# Patient Record
Sex: FEMALE | Race: WHITE | NOT HISPANIC OR LATINO | Employment: OTHER | URBAN - METROPOLITAN AREA
[De-identification: names, ages, dates, MRNs, and addresses within clinical notes are randomized per-mention and may not be internally consistent; named-entity substitution may affect disease eponyms.]

---

## 2025-01-19 ENCOUNTER — APPOINTMENT (OUTPATIENT)
Dept: CARDIOLOGY | Facility: HOSPITAL | Age: 80
End: 2025-01-19
Payer: MEDICARE

## 2025-01-19 ENCOUNTER — APPOINTMENT (OUTPATIENT)
Dept: RADIOLOGY | Facility: HOSPITAL | Age: 80
End: 2025-01-19
Payer: MEDICARE

## 2025-01-19 ENCOUNTER — HOSPITAL ENCOUNTER (INPATIENT)
Facility: HOSPITAL | Age: 80
LOS: 4 days | Discharge: NURSING, REHAB OR LONG-TERM CARE | End: 2025-01-24
Attending: EMERGENCY MEDICINE | Admitting: INTERNAL MEDICINE
Payer: MEDICARE

## 2025-01-19 DIAGNOSIS — I10 HYPERTENSION, UNSPECIFIED TYPE: ICD-10-CM

## 2025-01-19 DIAGNOSIS — R94.31 ABNORMAL ELECTROCARDIOGRAM (ECG) (EKG): ICD-10-CM

## 2025-01-19 DIAGNOSIS — R79.89 ELEVATED TROPONIN: ICD-10-CM

## 2025-01-19 DIAGNOSIS — D50.9 IRON DEFICIENCY ANEMIA, UNSPECIFIED IRON DEFICIENCY ANEMIA TYPE: ICD-10-CM

## 2025-01-19 DIAGNOSIS — R41.0 DELIRIUM: ICD-10-CM

## 2025-01-19 DIAGNOSIS — R41.0 CONFUSION AND DISORIENTATION: Primary | ICD-10-CM

## 2025-01-19 LAB
ACANTHOCYTES BLD QL SMEAR: NORMAL
ALBUMIN SERPL BCP-MCNC: 4.6 G/DL (ref 3.4–5)
ALP SERPL-CCNC: 85 U/L (ref 33–136)
ALT SERPL W P-5'-P-CCNC: 41 U/L (ref 7–45)
AMMONIA PLAS-SCNC: 28 UMOL/L (ref 16–53)
ANION GAP SERPL CALC-SCNC: 13 MMOL/L (ref 10–20)
AST SERPL W P-5'-P-CCNC: 103 U/L (ref 9–39)
BASOPHILS # BLD AUTO: 0.02 X10*3/UL (ref 0–0.1)
BASOPHILS NFR BLD AUTO: 0.3 %
BILIRUB SERPL-MCNC: 1.6 MG/DL (ref 0–1.2)
BNP SERPL-MCNC: 350 PG/ML (ref 0–99)
BUN SERPL-MCNC: 20 MG/DL (ref 6–23)
CALCIUM SERPL-MCNC: 9.2 MG/DL (ref 8.6–10.3)
CARDIAC TROPONIN I PNL SERPL HS: 74 NG/L (ref 0–13)
CHLORIDE SERPL-SCNC: 106 MMOL/L (ref 98–107)
CO2 SERPL-SCNC: 26 MMOL/L (ref 21–32)
CREAT SERPL-MCNC: 0.87 MG/DL (ref 0.5–1.05)
EGFRCR SERPLBLD CKD-EPI 2021: 67 ML/MIN/1.73M*2
EOSINOPHIL # BLD AUTO: 0.1 X10*3/UL (ref 0–0.4)
EOSINOPHIL NFR BLD AUTO: 1.7 %
ERYTHROCYTE [DISTWIDTH] IN BLOOD BY AUTOMATED COUNT: 15.3 % (ref 11.5–14.5)
FLUAV RNA RESP QL NAA+PROBE: NOT DETECTED
FLUBV RNA RESP QL NAA+PROBE: NOT DETECTED
GIANT PLATELETS BLD QL SMEAR: NORMAL
GLUCOSE SERPL-MCNC: 87 MG/DL (ref 74–99)
HCT VFR BLD AUTO: 29.2 % (ref 36–46)
HGB BLD-MCNC: 9.1 G/DL (ref 12–16)
IMM GRANULOCYTES # BLD AUTO: 0.02 X10*3/UL (ref 0–0.5)
IMM GRANULOCYTES NFR BLD AUTO: 0.3 % (ref 0–0.9)
LACTATE SERPL-SCNC: 1.1 MMOL/L (ref 0.4–2)
LYMPHOCYTES # BLD AUTO: 0.99 X10*3/UL (ref 0.8–3)
LYMPHOCYTES NFR BLD AUTO: 16.6 %
MAGNESIUM SERPL-MCNC: 1.96 MG/DL (ref 1.6–2.4)
MCH RBC QN AUTO: 30.2 PG (ref 26–34)
MCHC RBC AUTO-ENTMCNC: 31.2 G/DL (ref 32–36)
MCV RBC AUTO: 97 FL (ref 80–100)
MONOCYTES # BLD AUTO: 0.5 X10*3/UL (ref 0.05–0.8)
MONOCYTES NFR BLD AUTO: 8.4 %
NEUTROPHILS # BLD AUTO: 4.33 X10*3/UL (ref 1.6–5.5)
NEUTROPHILS NFR BLD AUTO: 72.7 %
NRBC BLD-RTO: 0 /100 WBCS (ref 0–0)
PLATELET # BLD AUTO: ABNORMAL 10*3/UL
POTASSIUM SERPL-SCNC: 4.7 MMOL/L (ref 3.5–5.3)
PROT SERPL-MCNC: 7.1 G/DL (ref 6.4–8.2)
RBC # BLD AUTO: 3.01 X10*6/UL (ref 4–5.2)
RBC MORPH BLD: NORMAL
SARS-COV-2 RNA RESP QL NAA+PROBE: NOT DETECTED
SCHISTOCYTES BLD QL SMEAR: NORMAL
SODIUM SERPL-SCNC: 140 MMOL/L (ref 136–145)
TSH SERPL-ACNC: 2.24 MIU/L (ref 0.44–3.98)
WBC # BLD AUTO: 6 X10*3/UL (ref 4.4–11.3)

## 2025-01-19 PROCEDURE — 71045 X-RAY EXAM CHEST 1 VIEW: CPT

## 2025-01-19 PROCEDURE — 93005 ELECTROCARDIOGRAM TRACING: CPT

## 2025-01-19 PROCEDURE — 70450 CT HEAD/BRAIN W/O DYE: CPT

## 2025-01-19 PROCEDURE — 71045 X-RAY EXAM CHEST 1 VIEW: CPT | Performed by: RADIOLOGY

## 2025-01-19 PROCEDURE — 99285 EMERGENCY DEPT VISIT HI MDM: CPT | Mod: 25 | Performed by: EMERGENCY MEDICINE

## 2025-01-19 PROCEDURE — 85025 COMPLETE CBC W/AUTO DIFF WBC: CPT

## 2025-01-19 PROCEDURE — 82140 ASSAY OF AMMONIA: CPT

## 2025-01-19 PROCEDURE — 36415 COLL VENOUS BLD VENIPUNCTURE: CPT

## 2025-01-19 PROCEDURE — 84443 ASSAY THYROID STIM HORMONE: CPT

## 2025-01-19 PROCEDURE — 99285 EMERGENCY DEPT VISIT HI MDM: CPT | Performed by: EMERGENCY MEDICINE

## 2025-01-19 PROCEDURE — 83880 ASSAY OF NATRIURETIC PEPTIDE: CPT

## 2025-01-19 PROCEDURE — 83735 ASSAY OF MAGNESIUM: CPT

## 2025-01-19 PROCEDURE — 70450 CT HEAD/BRAIN W/O DYE: CPT | Performed by: RADIOLOGY

## 2025-01-19 PROCEDURE — 84484 ASSAY OF TROPONIN QUANT: CPT

## 2025-01-19 PROCEDURE — 83605 ASSAY OF LACTIC ACID: CPT

## 2025-01-19 PROCEDURE — 80053 COMPREHEN METABOLIC PANEL: CPT

## 2025-01-19 PROCEDURE — 93010 ELECTROCARDIOGRAM REPORT: CPT | Performed by: EMERGENCY MEDICINE

## 2025-01-19 PROCEDURE — 87040 BLOOD CULTURE FOR BACTERIA: CPT | Mod: STJLAB

## 2025-01-19 PROCEDURE — 87636 SARSCOV2 & INF A&B AMP PRB: CPT

## 2025-01-19 RX ORDER — LABETALOL HYDROCHLORIDE 5 MG/ML
10 INJECTION, SOLUTION INTRAVENOUS ONCE
Status: DISCONTINUED | OUTPATIENT
Start: 2025-01-19 | End: 2025-01-20

## 2025-01-19 ASSESSMENT — LIFESTYLE VARIABLES
EVER HAD A DRINK FIRST THING IN THE MORNING TO STEADY YOUR NERVES TO GET RID OF A HANGOVER: NO
TOTAL SCORE: 0
EVER FELT BAD OR GUILTY ABOUT YOUR DRINKING: NO
HAVE PEOPLE ANNOYED YOU BY CRITICIZING YOUR DRINKING: NO
HAVE YOU EVER FELT YOU SHOULD CUT DOWN ON YOUR DRINKING: NO

## 2025-01-19 ASSESSMENT — PAIN - FUNCTIONAL ASSESSMENT: PAIN_FUNCTIONAL_ASSESSMENT: 0-10

## 2025-01-19 ASSESSMENT — COLUMBIA-SUICIDE SEVERITY RATING SCALE - C-SSRS
1. IN THE PAST MONTH, HAVE YOU WISHED YOU WERE DEAD OR WISHED YOU COULD GO TO SLEEP AND NOT WAKE UP?: NO
2. HAVE YOU ACTUALLY HAD ANY THOUGHTS OF KILLING YOURSELF?: NO
6. HAVE YOU EVER DONE ANYTHING, STARTED TO DO ANYTHING, OR PREPARED TO DO ANYTHING TO END YOUR LIFE?: NO

## 2025-01-19 ASSESSMENT — PAIN SCALES - GENERAL: PAINLEVEL_OUTOF10: 0 - NO PAIN

## 2025-01-20 ENCOUNTER — APPOINTMENT (OUTPATIENT)
Dept: CARDIOLOGY | Facility: HOSPITAL | Age: 80
End: 2025-01-20
Payer: MEDICARE

## 2025-01-20 PROBLEM — R46.89 WANDERING BEHAVIOR: Status: ACTIVE | Noted: 2025-01-20

## 2025-01-20 PROBLEM — R41.0 DELIRIUM: Status: ACTIVE | Noted: 2025-01-20

## 2025-01-20 PROBLEM — R41.0 CONFUSION AND DISORIENTATION: Status: ACTIVE | Noted: 2025-01-20

## 2025-01-20 PROBLEM — R79.89 ELEVATED TROPONIN: Status: ACTIVE | Noted: 2025-01-20

## 2025-01-20 LAB
APPEARANCE UR: CLEAR
BILIRUB UR STRIP.AUTO-MCNC: NEGATIVE MG/DL
CARDIAC TROPONIN I PNL SERPL HS: 70 NG/L (ref 0–13)
CARDIAC TROPONIN I PNL SERPL HS: 91 NG/L (ref 0–13)
COLOR UR: YELLOW
GLUCOSE UR STRIP.AUTO-MCNC: NORMAL MG/DL
HOLD SPECIMEN: NORMAL
KETONES UR STRIP.AUTO-MCNC: NEGATIVE MG/DL
LEUKOCYTE ESTERASE UR QL STRIP.AUTO: ABNORMAL
NITRITE UR QL STRIP.AUTO: NEGATIVE
PH UR STRIP.AUTO: 6.5 [PH]
PROT UR STRIP.AUTO-MCNC: ABNORMAL MG/DL
RBC # UR STRIP.AUTO: ABNORMAL /UL
RBC #/AREA URNS AUTO: NORMAL /HPF
SP GR UR STRIP.AUTO: 1.02
TSH SERPL-ACNC: 1.85 MIU/L (ref 0.44–3.98)
UROBILINOGEN UR STRIP.AUTO-MCNC: NORMAL MG/DL
VIT B12 SERPL-MCNC: 283 PG/ML (ref 211–911)
WBC #/AREA URNS AUTO: NORMAL /HPF

## 2025-01-20 PROCEDURE — 36415 COLL VENOUS BLD VENIPUNCTURE: CPT | Performed by: NURSE PRACTITIONER

## 2025-01-20 PROCEDURE — 81001 URINALYSIS AUTO W/SCOPE: CPT

## 2025-01-20 PROCEDURE — 84484 ASSAY OF TROPONIN QUANT: CPT

## 2025-01-20 PROCEDURE — 2500000004 HC RX 250 GENERAL PHARMACY W/ HCPCS (ALT 636 FOR OP/ED): Performed by: NURSE PRACTITIONER

## 2025-01-20 PROCEDURE — 36415 COLL VENOUS BLD VENIPUNCTURE: CPT

## 2025-01-20 PROCEDURE — 96375 TX/PRO/DX INJ NEW DRUG ADDON: CPT

## 2025-01-20 PROCEDURE — 96365 THER/PROPH/DIAG IV INF INIT: CPT

## 2025-01-20 PROCEDURE — 2500000004 HC RX 250 GENERAL PHARMACY W/ HCPCS (ALT 636 FOR OP/ED): Performed by: EMERGENCY MEDICINE

## 2025-01-20 PROCEDURE — 84484 ASSAY OF TROPONIN QUANT: CPT | Performed by: NURSE PRACTITIONER

## 2025-01-20 PROCEDURE — 87086 URINE CULTURE/COLONY COUNT: CPT | Mod: STJLAB

## 2025-01-20 PROCEDURE — 2500000001 HC RX 250 WO HCPCS SELF ADMINISTERED DRUGS (ALT 637 FOR MEDICARE OP): Performed by: EMERGENCY MEDICINE

## 2025-01-20 PROCEDURE — 84443 ASSAY THYROID STIM HORMONE: CPT | Performed by: NURSE PRACTITIONER

## 2025-01-20 PROCEDURE — 99222 1ST HOSP IP/OBS MODERATE 55: CPT | Performed by: NURSE PRACTITIONER

## 2025-01-20 PROCEDURE — 2060000001 HC INTERMEDIATE ICU ROOM DAILY

## 2025-01-20 PROCEDURE — 82607 VITAMIN B-12: CPT | Mod: STJLAB | Performed by: NURSE PRACTITIONER

## 2025-01-20 RX ORDER — ONDANSETRON HYDROCHLORIDE 2 MG/ML
4 INJECTION, SOLUTION INTRAVENOUS EVERY 8 HOURS PRN
Status: DISCONTINUED | OUTPATIENT
Start: 2025-01-20 | End: 2025-01-24 | Stop reason: HOSPADM

## 2025-01-20 RX ORDER — ROSUVASTATIN CALCIUM 5 MG/1
5 TABLET, COATED ORAL 2 TIMES WEEKLY
Status: DISCONTINUED | OUTPATIENT
Start: 2025-01-23 | End: 2025-01-24 | Stop reason: HOSPADM

## 2025-01-20 RX ORDER — ACETAMINOPHEN 160 MG/5ML
650 SOLUTION ORAL EVERY 4 HOURS PRN
Status: DISCONTINUED | OUTPATIENT
Start: 2025-01-20 | End: 2025-01-24 | Stop reason: HOSPADM

## 2025-01-20 RX ORDER — ROSUVASTATIN CALCIUM 5 MG/1
5 TABLET, COATED ORAL 2 TIMES WEEKLY
COMMUNITY

## 2025-01-20 RX ORDER — DONEPEZIL HYDROCHLORIDE 5 MG/1
5 TABLET, FILM COATED ORAL NIGHTLY
COMMUNITY

## 2025-01-20 RX ORDER — ONDANSETRON 4 MG/1
4 TABLET, ORALLY DISINTEGRATING ORAL EVERY 8 HOURS PRN
Status: DISCONTINUED | OUTPATIENT
Start: 2025-01-20 | End: 2025-01-24 | Stop reason: HOSPADM

## 2025-01-20 RX ORDER — NITROFURANTOIN 25; 75 MG/1; MG/1
100 CAPSULE ORAL 2 TIMES DAILY
COMMUNITY
End: 2025-01-24 | Stop reason: HOSPADM

## 2025-01-20 RX ORDER — TALC
3 POWDER (GRAM) TOPICAL NIGHTLY PRN
Status: DISCONTINUED | OUTPATIENT
Start: 2025-01-20 | End: 2025-01-24 | Stop reason: HOSPADM

## 2025-01-20 RX ORDER — VIT C/E/ZN/COPPR/LUTEIN/ZEAXAN 250MG-90MG
25 CAPSULE ORAL DAILY
COMMUNITY

## 2025-01-20 RX ORDER — CHOLECALCIFEROL (VITAMIN D3) 25 MCG
1000 TABLET ORAL DAILY
Status: DISCONTINUED | OUTPATIENT
Start: 2025-01-20 | End: 2025-01-24 | Stop reason: HOSPADM

## 2025-01-20 RX ORDER — METOPROLOL TARTRATE 1 MG/ML
5 INJECTION, SOLUTION INTRAVENOUS ONCE
Status: COMPLETED | OUTPATIENT
Start: 2025-01-20 | End: 2025-01-20

## 2025-01-20 RX ORDER — CEFTRIAXONE 1 G/50ML
1 INJECTION, SOLUTION INTRAVENOUS EVERY 24 HOURS
Status: DISCONTINUED | OUTPATIENT
Start: 2025-01-21 | End: 2025-01-23

## 2025-01-20 RX ORDER — POLYETHYLENE GLYCOL 3350 17 G/17G
17 POWDER, FOR SOLUTION ORAL DAILY PRN
Status: DISCONTINUED | OUTPATIENT
Start: 2025-01-20 | End: 2025-01-24 | Stop reason: HOSPADM

## 2025-01-20 RX ORDER — ACETAMINOPHEN 325 MG/1
650 TABLET ORAL EVERY 4 HOURS PRN
Status: DISCONTINUED | OUTPATIENT
Start: 2025-01-20 | End: 2025-01-24 | Stop reason: HOSPADM

## 2025-01-20 RX ORDER — BISMUTH SUBSALICYLATE 262 MG
1 TABLET,CHEWABLE ORAL DAILY
COMMUNITY

## 2025-01-20 RX ORDER — SODIUM CHLORIDE 0.9 % (FLUSH) 0.9 %
10 SYRINGE (ML) INJECTION EVERY 8 HOURS SCHEDULED
Status: DISCONTINUED | OUTPATIENT
Start: 2025-01-20 | End: 2025-01-24 | Stop reason: HOSPADM

## 2025-01-20 RX ORDER — AMLODIPINE BESYLATE 5 MG/1
5 TABLET ORAL DAILY
Status: DISCONTINUED | OUTPATIENT
Start: 2025-01-20 | End: 2025-01-24 | Stop reason: HOSPADM

## 2025-01-20 RX ORDER — NAPROXEN SODIUM 220 MG/1
324 TABLET, FILM COATED ORAL ONCE
Status: COMPLETED | OUTPATIENT
Start: 2025-01-20 | End: 2025-01-20

## 2025-01-20 RX ORDER — DONEPEZIL HYDROCHLORIDE 5 MG/1
5 TABLET, FILM COATED ORAL NIGHTLY
Status: DISCONTINUED | OUTPATIENT
Start: 2025-01-20 | End: 2025-01-24 | Stop reason: HOSPADM

## 2025-01-20 RX ORDER — ASPIRIN 81 MG/1
81 TABLET ORAL DAILY
Status: DISCONTINUED | OUTPATIENT
Start: 2025-01-21 | End: 2025-01-24 | Stop reason: HOSPADM

## 2025-01-20 RX ORDER — ASPIRIN 81 MG/1
81 TABLET ORAL DAILY
COMMUNITY

## 2025-01-20 RX ORDER — ACETAMINOPHEN 650 MG/1
650 SUPPOSITORY RECTAL EVERY 4 HOURS PRN
Status: DISCONTINUED | OUTPATIENT
Start: 2025-01-20 | End: 2025-01-24 | Stop reason: HOSPADM

## 2025-01-20 RX ORDER — CEFTRIAXONE 1 G/50ML
1 INJECTION, SOLUTION INTRAVENOUS ONCE
Status: COMPLETED | OUTPATIENT
Start: 2025-01-20 | End: 2025-01-20

## 2025-01-20 RX ADMIN — ASPIRIN 81 MG CHEWABLE TABLET 324 MG: 81 TABLET CHEWABLE at 01:15

## 2025-01-20 RX ADMIN — Medication 10 ML: at 14:24

## 2025-01-20 RX ADMIN — Medication 10 ML: at 06:51

## 2025-01-20 RX ADMIN — CEFTRIAXONE SODIUM 1 G: 1 INJECTION, SOLUTION INTRAVENOUS at 01:15

## 2025-01-20 RX ADMIN — METOPROLOL TARTRATE 5 MG: 5 INJECTION INTRAVENOUS at 14:24

## 2025-01-20 SDOH — SOCIAL STABILITY: SOCIAL INSECURITY: ARE YOU MARRIED, WIDOWED, DIVORCED, SEPARATED, NEVER MARRIED, OR LIVING WITH A PARTNER?: MARRIED

## 2025-01-20 SDOH — SOCIAL STABILITY: SOCIAL INSECURITY: ABUSE: ADULT

## 2025-01-20 SDOH — ECONOMIC STABILITY: INCOME INSECURITY
IN THE PAST 12 MONTHS HAS THE ELECTRIC, GAS, OIL, OR WATER COMPANY THREATENED TO SHUT OFF SERVICES IN YOUR HOME?: PATIENT UNABLE TO ANSWER

## 2025-01-20 SDOH — HEALTH STABILITY: MENTAL HEALTH
DO YOU FEEL STRESS - TENSE, RESTLESS, NERVOUS, OR ANXIOUS, OR UNABLE TO SLEEP AT NIGHT BECAUSE YOUR MIND IS TROUBLED ALL THE TIME - THESE DAYS?: NOT AT ALL

## 2025-01-20 SDOH — SOCIAL STABILITY: SOCIAL INSECURITY: DO YOU FEEL ANYONE HAS EXPLOITED OR TAKEN ADVANTAGE OF YOU FINANCIALLY OR OF YOUR PERSONAL PROPERTY?: NO

## 2025-01-20 SDOH — SOCIAL STABILITY: SOCIAL NETWORK: HOW OFTEN DO YOU GET TOGETHER WITH FRIENDS OR RELATIVES?: MORE THAN THREE TIMES A WEEK

## 2025-01-20 SDOH — HEALTH STABILITY: MENTAL HEALTH: HOW MANY DRINKS CONTAINING ALCOHOL DO YOU HAVE ON A TYPICAL DAY WHEN YOU ARE DRINKING?: 1 OR 2

## 2025-01-20 SDOH — SOCIAL STABILITY: SOCIAL INSECURITY: HAS ANYONE EVER THREATENED TO HURT YOUR FAMILY OR YOUR PETS?: NO

## 2025-01-20 SDOH — SOCIAL STABILITY: SOCIAL INSECURITY: DOES ANYONE TRY TO KEEP YOU FROM HAVING/CONTACTING OTHER FRIENDS OR DOING THINGS OUTSIDE YOUR HOME?: NO

## 2025-01-20 SDOH — HEALTH STABILITY: MENTAL HEALTH: HOW OFTEN DO YOU HAVE A DRINK CONTAINING ALCOHOL?: MONTHLY OR LESS

## 2025-01-20 SDOH — SOCIAL STABILITY: SOCIAL NETWORK
IN A TYPICAL WEEK, HOW MANY TIMES DO YOU TALK ON THE PHONE WITH FAMILY, FRIENDS, OR NEIGHBORS?: MORE THAN THREE TIMES A WEEK

## 2025-01-20 SDOH — SOCIAL STABILITY: SOCIAL NETWORK
DO YOU BELONG TO ANY CLUBS OR ORGANIZATIONS SUCH AS CHURCH GROUPS, UNIONS, FRATERNAL OR ATHLETIC GROUPS, OR SCHOOL GROUPS?: NO

## 2025-01-20 SDOH — HEALTH STABILITY: PHYSICAL HEALTH: ON AVERAGE, HOW MANY MINUTES DO YOU ENGAGE IN EXERCISE AT THIS LEVEL?: 0 MIN

## 2025-01-20 SDOH — HEALTH STABILITY: PHYSICAL HEALTH
HOW OFTEN DO YOU NEED TO HAVE SOMEONE HELP YOU WHEN YOU READ INSTRUCTIONS, PAMPHLETS, OR OTHER WRITTEN MATERIAL FROM YOUR DOCTOR OR PHARMACY?: SOMETIMES

## 2025-01-20 SDOH — ECONOMIC STABILITY: FOOD INSECURITY
WITHIN THE PAST 12 MONTHS, THE FOOD YOU BOUGHT JUST DIDN'T LAST AND YOU DIDN'T HAVE MONEY TO GET MORE.: PATIENT UNABLE TO ANSWER

## 2025-01-20 SDOH — HEALTH STABILITY: MENTAL HEALTH: HOW OFTEN DO YOU HAVE SIX OR MORE DRINKS ON ONE OCCASION?: NEVER

## 2025-01-20 SDOH — SOCIAL STABILITY: SOCIAL INSECURITY: HAVE YOU HAD ANY THOUGHTS OF HARMING ANYONE ELSE?: NO

## 2025-01-20 SDOH — SOCIAL STABILITY: SOCIAL INSECURITY: ARE YOU OR HAVE YOU BEEN THREATENED OR ABUSED PHYSICALLY, EMOTIONALLY, OR SEXUALLY BY ANYONE?: NO

## 2025-01-20 SDOH — SOCIAL STABILITY: SOCIAL NETWORK: HOW OFTEN DO YOU ATTEND CHURCH OR RELIGIOUS SERVICES?: 1 TO 4 TIMES PER YEAR

## 2025-01-20 SDOH — ECONOMIC STABILITY: FOOD INSECURITY
WITHIN THE PAST 12 MONTHS, YOU WORRIED THAT YOUR FOOD WOULD RUN OUT BEFORE YOU GOT THE MONEY TO BUY MORE.: PATIENT UNABLE TO ANSWER

## 2025-01-20 SDOH — SOCIAL STABILITY: SOCIAL INSECURITY: DO YOU FEEL UNSAFE GOING BACK TO THE PLACE WHERE YOU ARE LIVING?: NO

## 2025-01-20 SDOH — SOCIAL STABILITY: SOCIAL INSECURITY: ARE THERE ANY APPARENT SIGNS OF INJURIES/BEHAVIORS THAT COULD BE RELATED TO ABUSE/NEGLECT?: NO

## 2025-01-20 SDOH — HEALTH STABILITY: PHYSICAL HEALTH: ON AVERAGE, HOW MANY DAYS PER WEEK DO YOU ENGAGE IN MODERATE TO STRENUOUS EXERCISE (LIKE A BRISK WALK)?: 0 DAYS

## 2025-01-20 SDOH — SOCIAL STABILITY: SOCIAL NETWORK: HOW OFTEN DO YOU ATTEND MEETINGS OF THE CLUBS OR ORGANIZATIONS YOU BELONG TO?: NEVER

## 2025-01-20 ASSESSMENT — COGNITIVE AND FUNCTIONAL STATUS - GENERAL
PATIENT BASELINE BEDBOUND: NO
DAILY ACTIVITIY SCORE: 24
MOBILITY SCORE: 24

## 2025-01-20 ASSESSMENT — LIFESTYLE VARIABLES
SKIP TO QUESTIONS 9-10: 1
SUBSTANCE_ABUSE_PAST_12_MONTHS: NO
AUDIT-C TOTAL SCORE: 1
PRESCIPTION_ABUSE_PAST_12_MONTHS: NO

## 2025-01-20 ASSESSMENT — PAIN SCALES - GENERAL
PAINLEVEL_OUTOF10: 0 - NO PAIN

## 2025-01-20 ASSESSMENT — PAIN - FUNCTIONAL ASSESSMENT: PAIN_FUNCTIONAL_ASSESSMENT: 0-10

## 2025-01-20 ASSESSMENT — ACTIVITIES OF DAILY LIVING (ADL): LACK_OF_TRANSPORTATION: PATIENT UNABLE TO ANSWER

## 2025-01-20 NOTE — ED NOTES
Pt son upset that the wait for a bed has been long. Feels like his mom needs to walk around and move. RN stated she is a fall risk, but son sates that his mom is not and she walks just fine. This RN educated family on why she is a fall risk and can not walk around the riddle.  agrees and is tearful. This RN ambulated pt at the bedside. Pt was very confused and not steady on her feat swaying back and fourth missing steps. Pt taken back to bed and multi. pillows used to pad pt. Dinner ordered for pt and for the  who remains in chair tearful. Chair offered, but family declined. Alarm on.      Fanny Hamilton RN  01/20/25 [id number]

## 2025-01-20 NOTE — ED PROVIDER NOTES
EMERGENCY DEPARTMENT ENCOUNTER      Pt Name: Isela Mcfadden  MRN: [id number]  Birthdate 1945  Date of evaluation: 1/19/2025  Provider: Jack Hennessy DO    CHIEF COMPLAINT       Chief Complaint   Patient presents with    Altered Mental Status     Pt from home was found wandering around outside. Pt currently on Macrobid for treatment of UTI. Pt has been having hallucinations per ems. Pt has hx of mild dementia. Pt's family called a hotline and was recommended to take her to the ER     HISTORY OF PRESENT ILLNESS    HPI  79-year-old female presents emergency department chief complaint of altered mental status.  Patient was brought in by EMS for reports of the patient walking around outside in the snow with bare feet and shorts on.  She was picked up by her neighbor and brought to her family's house where she was unable to recognize family members.  She also indicates that she has been concerned that people has been in her home intruders and having visual hallucinations.  The family cooperates with the stories.  Patient feels otherwise well here she has no complaint currently she denies any chest pain nausea vomiting or confusion she indicates that she feels well.  However she is ANO x 2.  Nursing Notes were reviewed.    PAST MEDICAL HISTORY     Past Medical History:   Diagnosis Date    Adrenal adenoma, left     Aortic stenosis     Chronic anemia     Cognitive communication deficit     Dementia     History of antineoplastic chemotherapy     History of breast cancer     History of COVID-19     History of uterine cancer     Hyperlipidemia     Hypertension     Intention tremor     Interstitial lung disease (Multi)     Memory loss     Multiple pulmonary nodules     Osteopenia          SURGICAL HISTORY       Past Surgical History:   Procedure Laterality Date    AORTIC VALVE REPLACEMENT      APPENDECTOMY      BREAST RECONSTRUCTION      COLONOSCOPY W/ POLYPECTOMY      DILATION AND CURETTAGE OF UTERUS      MASTECTOMY       TOTAL ABDOMINAL HYSTERECTOMY W/ BILATERAL SALPINGOOPHORECTOMY           CURRENT MEDICATIONS       Current Discharge Medication List        CONTINUE these medications which have NOT CHANGED    Details   aspirin 81 mg EC tablet Take 1 tablet (81 mg) by mouth once daily.      nitrofurantoin, macrocrystal-monohydrate, (Macrobid) 100 mg capsule Take 1 capsule (100 mg) by mouth 2 times a day. Started 1/14/25 x 7 days      rosuvastatin (Crestor) 5 mg tablet Take 1 tablet (5 mg) by mouth 2 times a week. Sunday & Thursday      cholecalciferol (Vitamin D-3) 25 MCG (1000 UT) capsule Take 1 capsule (25 mcg) by mouth once daily.      donepezil (Aricept) 5 mg tablet Take 1 tablet (5 mg) by mouth once daily at bedtime.      multivitamin tablet Take 1 tablet by mouth once daily.             ALLERGIES     Penicillins and Erythromycin    FAMILY HISTORY       Family History   Problem Relation Name Age of Onset    Pancreatic cancer Mother      Esophageal cancer Father      Breast cancer Sister      Heart failure Brother            SOCIAL HISTORY       Social History     Socioeconomic History    Marital status:    Tobacco Use    Smoking status: Former     Types: Cigarettes    Smokeless tobacco: Never   Substance and Sexual Activity    Alcohol use: Never    Drug use: Never     Social Drivers of Health     Financial Resource Strain: Low Risk  (1/21/2025)    Overall Financial Resource Strain (CARDIA)     Difficulty of Paying Living Expenses: Not hard at all   Food Insecurity: No Food Insecurity (1/21/2025)    Hunger Vital Sign     Worried About Running Out of Food in the Last Year: Never true     Ran Out of Food in the Last Year: Never true   Transportation Needs: No Transportation Needs (1/21/2025)    PRAPARE - Transportation     Lack of Transportation (Medical): No     Lack of Transportation (Non-Medical): No   Physical Activity: Inactive (1/21/2025)    Exercise Vital Sign     Days of Exercise per Week: 0 days     Minutes of  Exercise per Session: 0 min   Stress: No Stress Concern Present (1/21/2025)    [employer] [place] [employer] - Occupational Stress Questionnaire     Feeling of Stress : Not at all   Social Connections: Moderately Integrated (1/21/2025)    Social Connection and Isolation Panel [NHANES]     Frequency of Communication with Friends and Family: More than three times a week     Frequency of Social Gatherings with Friends and Family: More than three times a week     Attends [employer] Services: 1 to 4 times per year     Active Member of Clubs or Organizations: No     Attends Club or Organization Meetings: Never     Marital Status:    Intimate Partner Violence: Not At Risk (1/21/2025)    Humiliation, Afraid, Rape, and Kick questionnaire     Fear of Current or Ex-Partner: No     Emotionally Abused: No     Physically Abused: No     Sexually Abused: No   Housing Stability: Low Risk  (1/21/2025)    Housing Stability Vital Sign     Unable to Pay for Housing in the Last Year: No     Number of Times Moved in the Last Year: 1     Homeless in the Last Year: No       SCREENINGS                        PHYSICAL EXAM    (up to 7 for level 4, 8 or more for level 5)     ED Triage Vitals [01/19/25 2204]   Temperature Heart Rate Respirations BP   36.3 °C (97.3 °F) (!) 101 18 (!) 188/80      Pulse Ox Temp Source Heart Rate Source Patient Position   98 % Temporal Monitor --      BP Location FiO2 (%)     -- --       Physical Exam  Constitutional:       Appearance: She is well-developed.   HENT:      Head: Normocephalic.      Mouth/Throat:      Mouth: Mucous membranes are moist.   Eyes:      Extraocular Movements: Extraocular movements intact.   Cardiovascular:      Rate and Rhythm: Normal rate and regular rhythm.      Heart sounds: Normal heart sounds.   Pulmonary:      Effort: Pulmonary effort is normal.      Breath sounds: Normal breath sounds.   Abdominal:      General: Bowel sounds are normal.      Palpations: Abdomen is  soft.   Musculoskeletal:         General: Normal range of motion.      Cervical back: Normal range of motion and neck supple.   Skin:     General: Skin is warm.      Capillary Refill: Capillary refill takes less than 2 seconds.   Neurological:      Mental Status: She is alert. She is disoriented and confused.   Psychiatric:         Mood and Affect: Mood normal.         Speech: Speech normal.         Behavior: Behavior normal.          DIAGNOSTIC RESULTS     LABS:  Labs Reviewed   CBC WITH AUTO DIFFERENTIAL - Abnormal       Result Value    WBC 6.0      nRBC 0.0      RBC 3.01 (*)     Hemoglobin 9.1 (*)     Hematocrit 29.2 (*)     MCV 97      MCH 30.2      MCHC 31.2 (*)     RDW 15.3 (*)     Platelets        Neutrophils % 72.7      Immature Granulocytes %, Automated 0.3      Lymphocytes % 16.6      Monocytes % 8.4      Eosinophils % 1.7      Basophils % 0.3      Neutrophils Absolute 4.33      Immature Granulocytes Absolute, Automated 0.02      Lymphocytes Absolute 0.99      Monocytes Absolute 0.50      Eosinophils Absolute 0.10      Basophils Absolute 0.02     COMPREHENSIVE METABOLIC PANEL - Abnormal    Glucose 87      Sodium 140      Potassium 4.7      Chloride 106      Bicarbonate 26      Anion Gap 13      Urea Nitrogen 20      Creatinine 0.87      eGFR 67      Calcium 9.2      Albumin 4.6      Alkaline Phosphatase 85      Total Protein 7.1       (*)     Bilirubin, Total 1.6 (*)     ALT 41     B-TYPE NATRIURETIC PEPTIDE - Abnormal     (*)     Narrative:        <100 pg/mL - Heart failure unlikely  100-299 pg/mL - Intermediate probability of acute heart                  failure exacerbation. Correlate with clinical                  context and patient history.    >=300 pg/mL - Heart Failure likely. Correlate with clinical                  context and patient history.    BNP testing is performed using different testing methodology at [facility] than at other [facility] hospitals. Direct result  comparisons should only be made within the same method.      URINALYSIS WITH REFLEX CULTURE AND MICROSCOPIC - Abnormal    Color, Urine Yellow      Appearance, Urine Clear      Specific Gravity, Urine 1.016      pH, Urine 6.5      Protein, Urine 10 (TRACE)      Glucose, Urine Normal      Blood, Urine 0.2 (2+) (*)     Ketones, Urine NEGATIVE      Bilirubin, Urine NEGATIVE      Urobilinogen, Urine Normal      Nitrite, Urine NEGATIVE      Leukocyte Esterase, Urine 75 [place]/uL (*)    SERIAL TROPONIN-INITIAL - Abnormal    Troponin I, High Sensitivity 74 (*)     Narrative:     Less than 99th percentile of normal range cutoff-  Female and children under 18 years old <14 ng/L; Male <21 ng/L: Negative  Repeat testing should be performed if clinically indicated.     Female and children under 18 years old 14-50 ng/L; Male 21-50 ng/L:  Consistent with possible cardiac damage and possible increased clinical   risk. Serial measurements may help to assess extent of myocardial damage.     >50 ng/L: Consistent with cardiac damage, increased clinical risk and  myocardial infarction. Serial measurements may help assess extent of   myocardial damage.      NOTE: Children less than 1 year old may have higher baseline troponin   levels and results should be interpreted in conjunction with the overall   clinical context.     NOTE: Troponin I testing is performed using a different   testing methodology at [facility] than at other   [facility]. Direct result comparisons should only   be made within the same method.   SERIAL TROPONIN, 1 HOUR - Abnormal    Troponin I, High Sensitivity 91 (*)     Narrative:     Less than 99th percentile of normal range cutoff-  Female and children under 18 years old <14 ng/L; Male <21 ng/L: Negative  Repeat testing should be performed if clinically indicated.     Female and children under 18 years old 14-50 ng/L; Male 21-50 ng/L:  Consistent with possible cardiac damage and possible increased  clinical   risk. Serial measurements may help to assess extent of myocardial damage.     >50 ng/L: Consistent with cardiac damage, increased clinical risk and  myocardial infarction. Serial measurements may help assess extent of   myocardial damage.      NOTE: Children less than 1 year old may have higher baseline troponin   levels and results should be interpreted in conjunction with the overall   clinical context.     NOTE: Troponin I testing is performed using a different   testing methodology at [facility] than at other   [facility]. Direct result comparisons should only   be made within the same method.   TROPONIN I, HIGH SENSITIVITY - Abnormal    Troponin I, High Sensitivity 70 (*)     Narrative:     Less than 99th percentile of normal range cutoff-  Female and children under 18 years old <14 ng/L; Male <21 ng/L: Negative  Repeat testing should be performed if clinically indicated.     Female and children under 18 years old 14-50 ng/L; Male 21-50 ng/L:  Consistent with possible cardiac damage and possible increased clinical   risk. Serial measurements may help to assess extent of myocardial damage.     >50 ng/L: Consistent with cardiac damage, increased clinical risk and  myocardial infarction. Serial measurements may help assess extent of   myocardial damage.      NOTE: Children less than 1 year old may have higher baseline troponin   levels and results should be interpreted in conjunction with the overall   clinical context.     NOTE: Troponin I testing is performed using a different   testing methodology at [facility] than at other   [facility]. Direct result comparisons should only   be made within the same method.   COMPREHENSIVE METABOLIC PANEL - Abnormal    Glucose 86      Sodium 140      Potassium 3.9      Chloride 107      Bicarbonate 27      Anion Gap 10      Urea Nitrogen 10      Creatinine 0.72      eGFR 84      Calcium 8.9      Albumin 3.3 (*)     Alkaline  Phosphatase 59      Total Protein 5.3 (*)     AST 52 (*)     Bilirubin, Total 0.9      ALT 29     CBC WITH AUTO DIFFERENTIAL - Abnormal    WBC 4.9      nRBC 0.0      RBC 2.62 (*)     Hemoglobin 7.9 (*)     Hematocrit 25.5 (*)     MCV 97      MCH 30.2      MCHC 31.0 (*)     RDW 15.2 (*)     Platelets 118 (*)     Neutrophils % 66.2      Immature Granulocytes %, Automated 0.2      Lymphocytes % 19.8      Monocytes % 8.5      Eosinophils % 4.7      Basophils % 0.6      Neutrophils Absolute 3.27      Immature Granulocytes Absolute, Automated 0.01      Lymphocytes Absolute 0.98      Monocytes Absolute 0.42      Eosinophils Absolute 0.23      Basophils Absolute 0.03     IRON AND TIBC - Abnormal    Iron 34 (*)     UIBC 215      TIBC 249      % Saturation 14 (*)    CBC - Abnormal    WBC 5.7      nRBC 0.0      RBC 2.73 (*)     Hemoglobin 8.3 (*)     Hematocrit 26.9 (*)     MCV 99      MCH 30.4      MCHC 30.9 (*)     RDW 15.2 (*)     Platelets 110 (*)    COMPREHENSIVE METABOLIC PANEL - Abnormal    Glucose 96      Sodium 140      Potassium 4.7      Chloride 105      Bicarbonate 28      Anion Gap 12      Urea Nitrogen 15      Creatinine 0.89      eGFR 65      Calcium 8.8      Albumin 3.4      Alkaline Phosphatase 64      Total Protein 5.4 (*)     AST 38      Bilirubin, Total 0.7      ALT 25     URINALYSIS WITH REFLEX CULTURE AND MICROSCOPIC - Abnormal    Color, Urine Yellow      Appearance, Urine Clear      Specific Gravity, Urine 1.018      pH, Urine 7.0      Protein, Urine NEGATIVE      Glucose, Urine Normal      Blood, Urine NEGATIVE      Ketones, Urine NEGATIVE      Bilirubin, Urine NEGATIVE      Urobilinogen, Urine 2 (1+) (*)     Nitrite, Urine NEGATIVE      Leukocyte Esterase, Urine NEGATIVE     BLOOD CULTURE - Normal    Blood Culture No growth at 2 days     BLOOD CULTURE - Normal    Blood Culture No growth at 2 days     URINE CULTURE - Normal    Urine Culture        Value: Growth indicates contamination with periurethral  jami. Repeat culture if clinically indicated.   MAGNESIUM - Normal    Magnesium 1.96     LACTATE - Normal    Lactate 1.1      Narrative:     Venipuncture immediately after or during the administration of Metamizole may lead to falsely low results. Testing should be performed immediately prior to Metamizole dosing.   AMMONIA - Normal    Ammonia 28     TSH WITH REFLEX TO FREE T4 IF ABNORMAL - Normal    Thyroid Stimulating Hormone 2.24      Narrative:     TSH testing is performed using different testing methodology at [facility] than at [facility]. Direct result comparisons should only be made within the same method.     INFLUENZA A AND B PCR - Normal    Flu A Result Not Detected      Flu B Result Not Detected      Narrative:     This assay is an in vitro diagnostic multiplex nucleic acid amplification test for the detection and discrimination of Influenza A & B from nasopharyngeal specimens, and has been validated for use at [facility]. Negative results do not preclude Influenza A/B infections, and should not be used as the sole basis for diagnosis, treatment, or other management decisions. If Influenza A/B and RSV PCR results are negative, testing for Parainfluenza virus, Adenovirus and Metapneumovirus is routinely performed for [facility] pediatric oncology and intensive care inpatients, and is available on other patients by placing an add-on request.   SARS-COV-2 PCR - Normal    Coronavirus 2019, PCR Not Detected      Narrative:     This assay has received FDA Emergency Use Authorization (EUA) and is only authorized for the duration of time that circumstances exist to justify the authorization of the emergency use of in vitro diagnostic tests for the detection of SARS-CoV-2 virus and/or diagnosis of COVID-19 infection under section 564(b)(1) of the Act, 21 U.S.C. 360bbb-3(b)(1). This assay is an in vitro diagnostic nucleic acid amplification test for the qualitative  detection of SARS-CoV-2 from nasopharyngeal specimens and has been validated for use at [facility]. Negative results do not preclude COVID-19 infections and should not be used as the sole basis for diagnosis, treatment, or other management decisions.     MICROSCOPIC ONLY, URINE - Normal    WBC, Urine 1-5      RBC, Urine 1-2     TSH - Normal    Thyroid Stimulating Hormone 1.85      Narrative:     TSH testing is performed using different testing methodology at [facility] than at [facility]. Direct result comparisons should only be made within the same method.     VITAMIN B12 - Normal    Vitamin B12 283     FERRITIN - Normal    Ferritin 114     URINALYSIS WITH REFLEX CULTURE AND MICROSCOPIC    Narrative:     The following orders were created for panel order Urinalysis with Reflex Culture and Microscopic.  Procedure                               Abnormality         Status                     ---------                               -----------         ------                     Urinalysis with Reflex C...[[id number]]  Abnormal            Final result               Extra Urine Gray Tube[[id number]]                            Final result                 Please view results for these tests on the individual orders.   TROPONIN SERIES- (INITIAL, 1 HR)    Narrative:     The following orders were created for panel order Troponin I Series, High Sensitivity (0, 1 HR).  Procedure                               Abnormality         Status                     ---------                               -----------         ------                     Troponin I, High Sensiti...[[id number]]  Abnormal            Final result               Troponin, High Sensitivi...[[id number]]  Abnormal            Final result                 Please view results for these tests on the individual orders.   EXTRA URINE GRAY TUBE    Extra Tube Hold for add-ons.     URINALYSIS WITH REFLEX CULTURE AND MICROSCOPIC     Narrative:     The following orders were created for panel order Urinalysis with Reflex Culture and Microscopic.  Procedure                               Abnormality         Status                     ---------                               -----------         ------                     Urinalysis with Reflex C...[[id number]]  Abnormal            Final result               Extra Urine Gray Tube[[id number]]                            In process                   Please view results for these tests on the individual orders.   EXTRA URINE GRAY TUBE   MORPHOLOGY    RBC Morphology See Below      RBC Fragments Few      Acanthocytes Few      Giant Platelets Few         All other labs were within normal range or not returned as of this dictation.    Imaging  Transthoracic Echo Complete         CT head wo IV contrast   Final Result   Cerebral atrophy and chronic periventricular white matter small   vessel ischemic change.        No evidence of acute intracranial hemorrhage.        MACRO:   None        Signed by: Mert Batres 1/19/2025 11:28 PM   Dictation workstation:   [id number]      XR chest 1 view   Final Result   No focal airspace consolidative opacity.        MACRO:   None        Signed by: Mert Batres 1/19/2025 11:29 PM   Dictation workstation:   [id number]           Procedures  Procedures     EMERGENCY DEPARTMENT COURSE/MDM:   Medical Decision Making  79-year-old female presents emergency department chief complaint of altered mental status.  Medical management treatment emergency department will consist of CT of the head as well as chest x-ray looking for any possible infectious cause of this patient's altered mental status.  Patient was recently treated with Macrobid she is currently on day 5 of 7 for suspected UTI.  Labs unremarkable with patient does have a mildly elevated troponin her blood pressure was initially elevated however is gone down a time in the emergency department.  No infectious sources in  regards to viral causes were found the patient does not have elevated white blood cell count.  Chest x-ray also appears appropriate.  CT of the head does not show any concerning findings.  The patient will be admitted to the hospital service for observation and management.  Her altered mental status is most likely due to a chronic cognitive decline.  Family does indicate that for the past 2 weeks she has been deteriorating more rapidly but the past year and has been a slow course.  ED Course as of 01/22/25 1349   Sun Jan 19, 2025 2215 EKG reveals normal sinus rhythm with ventricular rate of 95 bpm.  Normal axis.  No acute ischemic changes or injury pattern is present. [JJ]   2307 Troponin elevated at 74.  Patient is not actively having any chest pain.  She is notably tachycardic in the low 100s and hypertensive therefore 10 mg labetalol ordered. [JJ]      ED Course User Index  [JJ] Jack Hennessy DO         Diagnoses as of 01/22/25 1349   Confusion and disorientation        Patient and or family in agreement and understanding of treatment plan.  All questions answered.      I reviewed the case with the attending ED physician. The attending ED physician agrees with the plan. Patient and/or patient´s representative was counseled regarding labs, imaging, likely diagnosis, and plan. All questions were answered.    ED Medications administered this visit:    Medications   sodium chloride 0.9% flush 10 mL (10 mL intravenous Given 1/22/25 0546)   acetaminophen (Tylenol) tablet 650 mg (has no administration in time range)     Or   acetaminophen (Tylenol) oral liquid 650 mg (has no administration in time range)     Or   acetaminophen (Tylenol) suppository 650 mg (has no administration in time range)   ondansetron ODT (Zofran-ODT) disintegrating tablet 4 mg (has no administration in time range)     Or   ondansetron (Zofran) injection 4 mg (has no administration in time range)   melatonin tablet 3 mg (3 mg oral Given 1/21/25  2109)   polyethylene glycol (Glycolax, Miralax) packet 17 g (has no administration in time range)   aspirin EC tablet 81 mg (81 mg oral Given 1/22/25 0857)   cholecalciferol (Vitamin D-3) tablet 1,000 Units (1,000 Units oral Given 1/22/25 0857)   donepezil (Aricept) tablet 5 mg (5 mg oral Given 1/21/25 2109)   rosuvastatin (Crestor) tablet 5 mg (has no administration in time range)   cefTRIAXone (Rocephin) 1 g in dextrose (iso) IV 50 mL (0 g intravenous Stopped 1/22/25 0048)   amLODIPine (Norvasc) tablet 5 mg (5 mg oral Given 1/22/25 0857)   ferrous sulfate (325 mg ferrous sulfate) tablet 325 mg (325 mg oral Given 1/22/25 0857)   aspirin chewable tablet 324 mg (324 mg oral Given 1/20/25 0115)   cefTRIAXone (Rocephin) 1 g in dextrose (iso) IV 50 mL (0 g intravenous Stopped 1/20/25 0225)   metoprolol tartrate (Lopressor) injection 5 mg (5 mg intravenous Given 1/20/25 1424)       New Prescriptions from this visit:    Current Discharge Medication List          Follow-up:  No follow-up provider specified.      Final Impression:   1. Confusion and disorientation    2. Elevated troponin    3. Abnormal electrocardiogram (ECG) (EKG)          (Please note that portions of this note were completed with a voice recognition program.  Efforts were made to edit the dictations but occasionally words are mis-transcribed.)     Saud Vang MD  Resident  01/20/25 0020        The patient was seen by the resident/fellow.  I have personally performed a substantive portion of the encounter.  I have seen and examined the patient; agree with the workup, evaluation, MDM, management and diagnosis.  The care plan has been discussed with the resident; I have reviewed the resident’s note and agree with the documented findings.    79-year-old female presenting to the emergency department.  Patient had walked to her neighbors house in bare feet, they took her to her son's house.  Patient does have history of dementia.  She has been having  acutely worsening confusion and mental status over the past couple of weeks.  She has been having visual hallucinations, seeing people in her house who are not there.  She has been treated clinically for a urinary tract infection, she has been on Macrobid for 5 days.    On arrival she is afebrile, borderline tachycardic otherwise hemodynamically stable.  She is in no acute distress.  She has no concerns currently.  IV established and workup initiated to assess for evidence of metabolic encephalopathy.    Labs all reviewed.  She is noted to have elevated troponin of 74.  She is not endorsing any chest pain, no ischemic changes on her EKG.  CT of the head is negative for any intracranial hemorrhage or abnormality.    Given her troponinemia and worsening mental status change over the past couple of weeks decision made to admit the patient to the hospital for further evaluation management.                                              Jack Hennessy,   01/22/25 [id number]

## 2025-01-20 NOTE — PROGRESS NOTES
Called to the ED per family request. Spoke with the son. He wants the pt to go to [facility] memory care unit.  The son is frustrated because the pt is very confused and has been waiting for a bed since 1am. He was hoping to just move her to the facility so she would be more comfortable. She is waiting on consults from psychiatry, neurology and cardiology. Explained the pt would need a 3 day inpt stay for coverage under medicare. [facility] referral to [facility]. Attempted to call [facility] but she was gone for the day.     Robb Ball

## 2025-01-20 NOTE — H&P
History Of Present Illness  Isela Mcfadden is a 79 y.o. female with medical history significant for dementia, remote uterine cancer with total abdominal hysterectomy, remote breast cancer treated with chemotherapy and mastectomy, and aortic valve replacement this past July presenting to [facility] on 1/19/2025 with complaint of delirium.    Chart review indicates that Isela and her  Benito saw her primary care provider earlier this month on the 14th to discuss the fact that Isela has started having visual hallucinations, seeing and speaking to people who were not there, worsening memory deficits, and a tremor with intention.  She has been diagnosed with dementia for about a year but her symptoms seem to have been worsening for the past few weeks.  Her primary care provider ordered an MRI of her brain without contrast which returned unremarkable for acute issue.  She is also currently on Macrobid for suspected urinary tract infection though I am unable to find a urinalysis or culture to corroborate.    She comes in tonight with EMS after having an episode of wandering out of her home.  She was reportedly found outside by one of her neighbors who noted that she was barefoot and seemed confused.  The neighbor brought her to her son's house and Isela reportedly did not recognize her son or her  which is new for her.  It seems as though someone called her PCPs nurse triage line and recommendation was made to summon EMS which was done.    Labs tonight are noteworthy for initial troponin of 74 with recheck 91, BNP of 350 without evidence of fluid overload on exam, urinalysis with a trace of leukocyte esterase, and relatively stable chronic anemia with hemoglobin 9.1.  Imaging is unremarkable for acute issue.    On exam Isela is confused and disoriented but has no real complaints.  She says she believes that people have been intruding into her home and she mention to emergency department staff that  she might be aware that these are visual hallucinations.    Plan is to admit for further evaluation by psychiatry and perhaps neurology at her attending physicians discretion.  Further investigation into other potential causes of her worsening delirium with episode of wandering is underway.      Past Medical History  Past Medical History:   Diagnosis Date    Adrenal adenoma, left     Aortic stenosis     Cognitive communication deficit     Dementia     History of antineoplastic chemotherapy     History of breast cancer     History of uterine cancer     Hyperlipidemia     Hypertension     Intention tremor     Interstitial lung disease (Multi)     Memory loss     Multiple pulmonary nodules     Osteopenia        Surgical History  Past Surgical History:   Procedure Laterality Date    AORTIC VALVE REPLACEMENT      APPENDECTOMY      BREAST RECONSTRUCTION      COLONOSCOPY W/ POLYPECTOMY      DILATION AND CURETTAGE OF UTERUS      MASTECTOMY      TOTAL ABDOMINAL HYSTERECTOMY W/ BILATERAL SALPINGOOPHORECTOMY          Social History  Social History     Tobacco Use    Smoking status: Former     Types: Cigarettes    Smokeless tobacco: Never   Substance Use Topics    Alcohol use: Never    Drug use: Never        Family History  Family History   Problem Relation Name Age of Onset    Pancreatic cancer Mother      Esophageal cancer Father      Breast cancer Sister      Heart failure Brother          Allergies  Penicillins and Erythromycin    Review of Systems   Unable to perform ROS: Dementia       Physical Exam  Constitutional:       General: She is not in acute distress.     Appearance: Normal appearance. She is not ill-appearing, toxic-appearing or diaphoretic.   HENT:      Head: Normocephalic.      Right Ear: External ear normal.      Left Ear: External ear normal.      Nose: Nose normal.      Mouth/Throat:      Mouth: Mucous membranes are moist.   Eyes:      General: No scleral icterus.  Cardiovascular:      Rate and Rhythm:  "Normal rate and regular rhythm.      Pulses: Normal pulses.   Pulmonary:      Effort: No respiratory distress.   Abdominal:      General: Bowel sounds are normal. There is no distension.      Palpations: Abdomen is soft.      Tenderness: There is no abdominal tenderness.   Musculoskeletal:      Cervical back: No rigidity or tenderness.      Right lower leg: No edema.      Left lower leg: No edema.   Skin:     Capillary Refill: Capillary refill takes less than 2 seconds.      Findings: No erythema, lesion or rash.   Neurological:      General: No focal deficit present.      Mental Status: She is alert. She is disoriented.   Psychiatric:         Mood and Affect: Mood normal.         Behavior: Behavior normal.         Last Recorded Vitals  Visit Vitals  /78   Pulse 90   Temp 36.3 °C (97.3 °F) (Temporal)   Resp 20   Ht 1.6 m (5' 3\")   Wt 72.6 kg (160 lb)   SpO2 97%   BMI 28.34 kg/m²   Smoking Status Former   BSA 1.8 m²        Relevant Results  Results for orders placed or performed during the hospital encounter of 01/19/25 (from the past 24 hours)   CBC and Auto Differential   Result Value Ref Range    WBC 6.0 4.4 - 11.3 x10*3/uL    nRBC 0.0 0.0 - 0.0 /100 WBCs    RBC 3.01 (L) 4.00 - 5.20 x10*6/uL    Hemoglobin 9.1 (L) 12.0 - 16.0 g/dL    Hematocrit 29.2 (L) 36.0 - 46.0 %    MCV 97 80 - 100 fL    MCH 30.2 26.0 - 34.0 pg    MCHC 31.2 (L) 32.0 - 36.0 g/dL    RDW 15.3 (H) 11.5 - 14.5 %    Platelets      Neutrophils % 72.7 40.0 - 80.0 %    Immature Granulocytes %, Automated 0.3 0.0 - 0.9 %    Lymphocytes % 16.6 13.0 - 44.0 %    Monocytes % 8.4 2.0 - 10.0 %    Eosinophils % 1.7 0.0 - 6.0 %    Basophils % 0.3 0.0 - 2.0 %    Neutrophils Absolute 4.33 1.60 - 5.50 x10*3/uL    Immature Granulocytes Absolute, Automated 0.02 0.00 - 0.50 x10*3/uL    Lymphocytes Absolute 0.99 0.80 - 3.00 x10*3/uL    Monocytes Absolute 0.50 0.05 - 0.80 x10*3/uL    Eosinophils Absolute 0.10 0.00 - 0.40 x10*3/uL    Basophils Absolute 0.02 0.00 - " 0.10 x10*3/uL   Magnesium   Result Value Ref Range    Magnesium 1.96 1.60 - 2.40 mg/dL   Comprehensive metabolic panel   Result Value Ref Range    Glucose 87 74 - 99 mg/dL    Sodium 140 136 - 145 mmol/L    Potassium 4.7 3.5 - 5.3 mmol/L    Chloride 106 98 - 107 mmol/L    Bicarbonate 26 21 - 32 mmol/L    Anion Gap 13 10 - 20 mmol/L    Urea Nitrogen 20 6 - 23 mg/dL    Creatinine 0.87 0.50 - 1.05 mg/dL    eGFR 67 >60 mL/min/1.73m*2    Calcium 9.2 8.6 - 10.3 mg/dL    Albumin 4.6 3.4 - 5.0 g/dL    Alkaline Phosphatase 85 33 - 136 U/L    Total Protein 7.1 6.4 - 8.2 g/dL     (H) 9 - 39 U/L    Bilirubin, Total 1.6 (H) 0.0 - 1.2 mg/dL    ALT 41 7 - 45 U/L   Lactate   Result Value Ref Range    Lactate 1.1 0.4 - 2.0 mmol/L   Ammonia   Result Value Ref Range    Ammonia 28 16 - 53 umol/L   B-Type Natriuretic Peptide   Result Value Ref Range     (H) 0 - 99 pg/mL   TSH with reflex to Free T4 if abnormal   Result Value Ref Range    Thyroid Stimulating Hormone 2.24 0.44 - 3.98 mIU/L   Troponin I, High Sensitivity, Initial   Result Value Ref Range    Troponin I, High Sensitivity 74 (HH) 0 - 13 ng/L   Morphology   Result Value Ref Range    RBC Morphology See Below     RBC Fragments Few     Acanthocytes Few     Giant Platelets Few    Influenza A, and B PCR   Result Value Ref Range    Flu A Result Not Detected Not Detected    Flu B Result Not Detected Not Detected   Sars-CoV-2 PCR   Result Value Ref Range    Coronavirus 2019, PCR Not Detected Not Detected   Urinalysis with Reflex Culture and Microscopic   Result Value Ref Range    Color, Urine Yellow Light-Yellow, Yellow, Dark-Yellow    Appearance, Urine Clear Clear    Specific Gravity, Urine 1.016 1.005 - 1.035    pH, Urine 6.5 5.0, 5.5, 6.0, 6.5, 7.0, 7.5, 8.0    Protein, Urine 10 (TRACE) NEGATIVE, 10 (TRACE), 20 (TRACE) mg/dL    Glucose, Urine Normal Normal mg/dL    Blood, Urine 0.2 (2+) (A) NEGATIVE    Ketones, Urine NEGATIVE NEGATIVE mg/dL    Bilirubin, Urine NEGATIVE  NEGATIVE    Urobilinogen, Urine Normal Normal mg/dL    Nitrite, Urine NEGATIVE NEGATIVE    Leukocyte Esterase, Urine 75 [place]/uL (A) NEGATIVE   Microscopic Only, Urine   Result Value Ref Range    WBC, Urine 1-5 1-5, NONE /HPF    RBC, Urine 1-2 NONE, 1-2, 3-5 /HPF   Troponin, High Sensitivity, 1 Hour   Result Value Ref Range    Troponin I, High Sensitivity 91 (HH) 0 - 13 ng/L        Home Medications  Prior to Admission medications    Not on File       Medications  Scheduled medications    Continuous medications    PRN medications      Imaging  XR chest 1 view    Result Date: 1/19/2025  Interpreted By:  Mert Batres, STUDY: XR CHEST 1 VIEW;  1/19/2025 10:41 pm   INDICATION: Signs/Symptoms:ams.   COMPARISON: None.   ACCESSION NUMBER(S): [id number]   ORDERING CLINICIAN: ISRAEL RAMOS   FINDINGS: There is magnification of the cardiac silhouette secondary AP technique. TAVR. Asymmetric density over right thorax appears to correspond to breast prosthesis. No focal airspace consolidation or pleural effusion. No pneumothorax. Right axillary surgical clips.       No focal airspace consolidative opacity.   MACRO: None   Signed by: Mert Batres 1/19/2025 11:29 PM Dictation workstation:   [id number]    CT head wo IV contrast    Result Date: 1/19/2025  Interpreted By:  Mert Batres, STUDY: CT HEAD WO IV CONTRAST;  1/19/2025 11:00 pm   INDICATION: Signs/Symptoms:worsening confusion.   COMPARISON: None   ACCESSION NUMBER(S): [id number]   ORDERING CLINICIAN: ISRAEL RAMOS   TECHNIQUE: Contiguous axial images of the head were obtained without intravenous contrast.   FINDINGS: BRAIN PARENCHYMA:  There is cerebral atrophy and chronic periventricular white matter small vessel ischemic change. The gray white matter differentiation is preserved.  No mass effect or midline shift.   HEMORRHAGE:  No evidence of acute intracranial hemorrhage. VENTRICLES AND EXTRA-AXIAL SPACES:  The ventricles are within normal limits in size for brain  volume.  No evidence of abnormal extraaxial fluid collection. EXTRACRANIAL SOFT TISSUES:  Within normal limits. PARANASAL SINUSES/MASTOIDS:  The visualized paranasal sinuses and mastoid air cells are clear and well pneumatized. CALVARIUM:  No evidence of depressed calvarial fracture.   OTHER FINDINGS:  None       Cerebral atrophy and chronic periventricular white matter small vessel ischemic change.   No evidence of acute intracranial hemorrhage.   MACRO: None   Signed by: Mert Batres 1/19/2025 11:28 PM Dictation workstation:   [id number]    MR 3D post processing w report 2nd workstation    Result Date: 1/17/2025  * * *Final Report* * * DATE OF EXAM: Jan 17 2025  4:10PM   [facility]   [id number]  -  MRI 3D BRAIN QUANT  / ACCESSION #  [id number] PROCEDURE REASON: Cognitive impairment, mild, so stated      * * * * Physician Interpretation * * * *  EXAMINATION: MRI BRAIN W QUANT WO IVCON, MRI 3D BRAIN QUANT CLINICAL HISTORY: Cognitive impairment, mild, so stated TECHNIQUE: Axial PAULINE FLAIR, PAULINE T2, diffusion and susceptibility weighted imaging without contrast, using the ADNI dementia protocol and 3-D post-processing using the [employer] software at an independent workstation with concurrent physician supervision and images were created, reviewed and archived.  MQ:  [id number] COMPARISON: Head CT 08/30/2012 RESULT: QUALITATIVE: Acute Intracranial Process: None. Chronic Intracranial Process: Scattered patchy and confluent areas of increased T2 and FLAIR signal are present in the supratentorial white matter which is nonspecific but likely represents chronic microvascular ischemia. Number of chronic lacunar infarcts: Less than or equal to 2 Location of chronic lacunar infarcts: Right cerebellar hemisphere. Age related white matter changes ([facility]) rating: White matter lesions: 2 Basal ganglia lesions: 2 Prior intracranial hemorrhage:      Parenchymal microhemorrhages: 0      Other (siderosis/macrohemorrhages (>10mm): Not Applicable  Amyloid Related Imaging Abnormalities:      ARIA-E: N/A      ARIA-H Microhemorrhage: N/A      ARIA-H Siderosis: N/A Qualitative brain and hippocampal volume loss for age:      Cortex: Moderate and symmetric      White Matter: Moderate and symmetric      Hippocampi: Moderate  and Symmetric Ventricles: Commensurate with volume loss. Brain Parenchymal Signal and Morphology: The brain parenchyma is otherwise within normal limits of signal and morphology.  There is no evidence of an intracranial mass or extraaxial fluid collection. Other Significant Findings: None. QUANTITATIVE: Exam Quality: Good for volumetric analysis. Segmentation: Negligible mismapping by visual inspection. Quantitative Data: Total Hippocampal Volume:   Percentile for Age: 1       Asymmetry Index: 20.6 Inferior Lateral Vent Volume:   Percentile for age: 99       Asymmetry Index: -36.8 Superior Lateral Vent Volume:   Percentile for age: 77       Asymmetry Index: 7.67 Temporal Lobe Cortex Volume:   Temporal Lobe Percentile for Age: 1       Temporal Lobe Asymmetry Index: 21.4  Frontal Lobe Cortex Volume:   Frontal Lobe Percentile for Age: 35       Frontal Lobe Asymmetry Index: 0.82  Parietal Lobe Cortex Volume:   Parietal Lobe Percentile for Age:1  Occipital Lobe Cortex Volume:   Occipital Lobe Percentile for Age: 84  Whole Brain Volume       Brain Percentile for Age: 43 Concordance between qualitative and quantitative hippocampal volume assessment: Concordant Change in brain volumes: No previous volumetric study for comparison See below for comparison of brain volumes in relation to the prior volumetric study:  Not applicable. The prior study was reprocessed with the current algorithm version for adequate comparison.  Please note that small variations may be due to standard measurement error.      Brain Volume:           Current percentile: N/A           Previous percentile: N/A      Hippocampal Volume:           Current percentile: N/A            Previous percentile: N/A      Superior Lateral Ventricle Volume Change:           Current percentile: N/A           Previous percentile: N/A      Inferior Lateral Ventricle Volume Change:           Current percentile: N/A           Previous percentile: N/A     Mean hippocampal volume loss among normal elderly: 0.7% per year, (-0.3 to 1.7;  Emani 2008; also Gordon 2010).    IMPRESSION: *  No evidence of an acute intracranial process or intracranial mass. *  Moderate generalized volume loss. *  Hippocampal volumes at the first percentile when compared to age matched normal controls by quantitative analysis. *  Moderate white matter disease which is nonspecific but likely reflective of chronic microvascular ischemia. *  No evidence of parenchymal microhemorrhages by MRI. REFERENCES: White Matter Lesions:      0 = No lesions, including symmetrical, well-defined caps or bands      1 = Focal Lesions      2 = Beginning of [place]      3 = Diffuse Involvement of Entire Region Basal Ganglia Lesions:      0 = No Lesions      1 = 1 Focal Lesion (>5mm)      2 = >1 Focal Lesion (>5mm)      3 = Confluent Lesions Gordon CERVANTES, et al. The clinical use of structural MRI in Alzheimer disease. Nature Reviews Neurology 6;67 (2010). Emani et al. Validation of a fully automated 3D hippocampal segmentation method using subjects with Alzheimer's disease mild cognitive impairment, and elderly controls. Neuroimage 43;59 (2008). Wahlund et al. A New Rating Scale for Age-Related White Matter Changes Applicable to MRI and CT. Stroke. 32:1318 (2001). *  Asymmetry index defined as difference between left and right volumes divided by mean or [(L-R/Mean) x 100] (%). ** Age-matched reference charts measure total hippocampal volume (% of intracranial volume). See results from the analysis charts for details. : JOSÉ MANUEL   Transcribe Date/Time: Jan 17 2025  5:13P Dictated by : ROSY COLBERT, DO This examination was interpreted and the  report reviewed and electronically signed by: ROSY COLBERT DO on Jan 17 2025  5:35PM  EST    MR brain wo IV contrast    Result Date: 1/17/2025  * * *Final Report* * * DATE OF EXAM: Jan 17 2025  4:10PM   [facility]   [id number]  -  MRI BRAIN W QUANT WO IVCON  / ACCESSION #  [id number] PROCEDURE REASON: Cognitive impairment, mild, so stated      * * * * Physician Interpretation * * * *  EXAMINATION: MRI BRAIN W QUANT WO IVCON, MRI 3D BRAIN QUANT CLINICAL HISTORY: Cognitive impairment, mild, so stated TECHNIQUE: Axial PAULINE FLAIR, PAULINE T2, diffusion and susceptibility weighted imaging without contrast, using the ADNI dementia protocol and 3-D post-processing using the [employer] software at an independent workstation with concurrent physician supervision and images were created, reviewed and archived.  MQ:  [id number]_[id number] COMPARISON: Head CT 08/30/2012 RESULT: QUALITATIVE:   Acute Intracranial Process: None. Chronic Intracranial Process: Scattered patchy and confluent areas of increased T2 and FLAIR signal are present in the supratentorial white matter which is nonspecific but likely represents chronic microvascular ischemia. Number of chronic lacunar infarcts: Less than or equal to 2 Location of chronic lacunar infarcts: Right cerebellar hemisphere. Age related white matter changes ([facility]) rating: White matter lesions: 2 Basal ganglia lesions: 2 Prior intracranial hemorrhage:      Parenchymal microhemorrhages: 0      Other (siderosis/macrohemorrhages (>10mm): Not Applicable Amyloid Related Imaging Abnormalities:      ARIA-E: N/A      ARIA-H Microhemorrhage: N/A      ARIA-H Siderosis: N/A Qualitative brain and hippocampal volume loss for age:      Cortex: Moderate and symmetric      White Matter: Moderate and symmetric      Hippocampi: Moderate  and Symmetric Ventricles: Commensurate with volume loss. Brain Parenchymal Signal and Morphology: The brain parenchyma is otherwise within normal limits of signal and morphology.  There is no  evidence of an intracranial mass or extraaxial fluid collection. Other Significant Findings: None. QUANTITATIVE: Exam Quality: Good for volumetric analysis. Segmentation: Negligible mismapping by visual inspection. Quantitative Data: Total Hippocampal Volume:   Percentile for Age: 1       Asymmetry Index: 20.6 Inferior Lateral Vent Volume:   Percentile for age: 99       Asymmetry Index: -36.8 Superior Lateral Vent Volume:   Percentile for age: 77       Asymmetry Index: 7.67 Temporal Lobe Cortex Volume:   Temporal Lobe Percentile for Age: 1       Temporal Lobe Asymmetry Index: 21.4  Frontal Lobe Cortex Volume:   Frontal Lobe Percentile for Age: 35       Frontal Lobe Asymmetry Index: 0.82  Parietal Lobe Cortex Volume:   Parietal Lobe Percentile for Age:1  Occipital Lobe Cortex Volume:   Occipital Lobe Percentile for Age: 84  Whole Brain Volume       Brain Percentile for Age: 43 Concordance between qualitative and quantitative hippocampal volume assessment: Concordant Change in brain volumes: No previous volumetric study for comparison See below for comparison of brain volumes in relation to the prior volumetric study:  Not applicable. The prior study was reprocessed with the current algorithm version for adequate comparison.  Please note that small variations may be due to standard measurement error.      Brain Volume:           Current percentile: N/A           Previous percentile: N/A      Hippocampal Volume:           Current percentile: N/A           Previous percentile: N/A      Superior Lateral Ventricle Volume Change:           Current percentile: N/A           Previous percentile: N/A      Inferior Lateral Ventricle Volume Change:           Current percentile: N/A           Previous percentile: N/A     Mean hippocampal volume loss among normal elderly: 0.7% per year, (-0.3 to 1.7;  Emani 2008; also Gordon 2010).    IMPRESSION: *  No evidence of an acute intracranial process or intracranial mass. *  Moderate  generalized volume loss. *  Hippocampal volumes at the first percentile when compared to age matched normal controls by quantitative analysis. *  Moderate white matter disease which is nonspecific but likely reflective of chronic microvascular ischemia. *  No evidence of parenchymal microhemorrhages by MRI. REFERENCES: White Matter Lesions:      0 = No lesions, including symmetrical, well-defined caps or bands      1 = Focal Lesions      2 = Beginning of [place]      3 = Diffuse Involvement of Entire Region Basal Ganglia Lesions:      0 = No Lesions      1 = 1 Focal Lesion (>5mm)      2 = >1 Focal Lesion (>5mm)      3 = Confluent Lesions Gordon CERVANTES et al. The clinical use of structural MRI in Alzheimer disease. Nature Reviews Neurology 6;67 (2010). Emani et al. Validation of a fully automated 3D hippocampal segmentation method using subjects with Alzheimer's disease mild cognitive impairment, and elderly controls. Neuroimage 43;59 (2008). Martha et al. A New Rating Scale for Age-Related White Matter Changes Applicable to MRI and CT. Stroke. 32:1318 (2001). *  Asymmetry index defined as difference between left and right volumes divided by mean or [(L-R/Mean) x 100] (%). ** Age-matched reference charts measure total hippocampal volume (% of intracranial volume). See results from the analysis charts for details. : JOSÉ MANUEL   Transcribe Date/Time: Jan 17 2025  5:13P Dictated by : ROSY COLBERT DO This examination was interpreted and the report reviewed and electronically signed by: ROSY COLBERT DO on Jan 17 2025  5:35PM  EST       Assessment/Plan   Assessment & Plan  Delirium  Further laboratory tests underway, psychiatry consult  Confusion and disorientation    Wandering behavior  Bed alarm  Elevated troponin  No reported chest pain, telemetry monitoring, recheck once more in the morning    Doubt the mild leukocyte esterase on her urinalysis represents a significant enough UTI to cause her  symptoms, we will plan to finish Macrobid while new culture is pending    Plan to resume home medications as appropriate, see orders for additional plan elements, management deferred to attending and consult physicians.    VTE prophylaxis:   DVT prophylaxis: SCDs      Code Status  Full code    I spent 45 minutes in the professional and overall care of this patient.      Tessie Osborne, BRIAN-CNP  [facility] pager [phone]

## 2025-01-21 ENCOUNTER — APPOINTMENT (OUTPATIENT)
Dept: CARDIOLOGY | Facility: HOSPITAL | Age: 80
End: 2025-01-21
Payer: MEDICARE

## 2025-01-21 LAB
ALBUMIN SERPL BCP-MCNC: 3.3 G/DL (ref 3.4–5)
ALP SERPL-CCNC: 59 U/L (ref 33–136)
ALT SERPL W P-5'-P-CCNC: 29 U/L (ref 7–45)
ANION GAP SERPL CALC-SCNC: 10 MMOL/L (ref 10–20)
AST SERPL W P-5'-P-CCNC: 52 U/L (ref 9–39)
BACTERIA UR CULT: NORMAL
BASOPHILS # BLD AUTO: 0.03 X10*3/UL (ref 0–0.1)
BASOPHILS NFR BLD AUTO: 0.6 %
BILIRUB SERPL-MCNC: 0.9 MG/DL (ref 0–1.2)
BUN SERPL-MCNC: 10 MG/DL (ref 6–23)
CALCIUM SERPL-MCNC: 8.9 MG/DL (ref 8.6–10.3)
CHLORIDE SERPL-SCNC: 107 MMOL/L (ref 98–107)
CO2 SERPL-SCNC: 27 MMOL/L (ref 21–32)
CREAT SERPL-MCNC: 0.72 MG/DL (ref 0.5–1.05)
EGFRCR SERPLBLD CKD-EPI 2021: 84 ML/MIN/1.73M*2
EOSINOPHIL # BLD AUTO: 0.23 X10*3/UL (ref 0–0.4)
EOSINOPHIL NFR BLD AUTO: 4.7 %
ERYTHROCYTE [DISTWIDTH] IN BLOOD BY AUTOMATED COUNT: 15.2 % (ref 11.5–14.5)
FERRITIN SERPL-MCNC: 114 NG/ML (ref 8–150)
GLUCOSE SERPL-MCNC: 86 MG/DL (ref 74–99)
HCT VFR BLD AUTO: 25.5 % (ref 36–46)
HGB BLD-MCNC: 7.9 G/DL (ref 12–16)
IMM GRANULOCYTES # BLD AUTO: 0.01 X10*3/UL (ref 0–0.5)
IMM GRANULOCYTES NFR BLD AUTO: 0.2 % (ref 0–0.9)
IRON SATN MFR SERPL: 14 % (ref 25–45)
IRON SERPL-MCNC: 34 UG/DL (ref 35–150)
LYMPHOCYTES # BLD AUTO: 0.98 X10*3/UL (ref 0.8–3)
LYMPHOCYTES NFR BLD AUTO: 19.8 %
MCH RBC QN AUTO: 30.2 PG (ref 26–34)
MCHC RBC AUTO-ENTMCNC: 31 G/DL (ref 32–36)
MCV RBC AUTO: 97 FL (ref 80–100)
MONOCYTES # BLD AUTO: 0.42 X10*3/UL (ref 0.05–0.8)
MONOCYTES NFR BLD AUTO: 8.5 %
NEUTROPHILS # BLD AUTO: 3.27 X10*3/UL (ref 1.6–5.5)
NEUTROPHILS NFR BLD AUTO: 66.2 %
NRBC BLD-RTO: 0 /100 WBCS (ref 0–0)
PLATELET # BLD AUTO: 118 X10*3/UL (ref 150–450)
POTASSIUM SERPL-SCNC: 3.9 MMOL/L (ref 3.5–5.3)
PROT SERPL-MCNC: 5.3 G/DL (ref 6.4–8.2)
RBC # BLD AUTO: 2.62 X10*6/UL (ref 4–5.2)
SODIUM SERPL-SCNC: 140 MMOL/L (ref 136–145)
TIBC SERPL-MCNC: 249 UG/DL (ref 240–445)
UIBC SERPL-MCNC: 215 UG/DL (ref 110–370)
WBC # BLD AUTO: 4.9 X10*3/UL (ref 4.4–11.3)

## 2025-01-21 PROCEDURE — 99222 1ST HOSP IP/OBS MODERATE 55: CPT | Performed by: PSYCHIATRY & NEUROLOGY

## 2025-01-21 PROCEDURE — 97116 GAIT TRAINING THERAPY: CPT | Mod: GP

## 2025-01-21 PROCEDURE — 36415 COLL VENOUS BLD VENIPUNCTURE: CPT | Performed by: NURSE PRACTITIONER

## 2025-01-21 PROCEDURE — 84075 ASSAY ALKALINE PHOSPHATASE: CPT | Performed by: NURSE PRACTITIONER

## 2025-01-21 PROCEDURE — 85025 COMPLETE CBC W/AUTO DIFF WBC: CPT | Performed by: INTERNAL MEDICINE

## 2025-01-21 PROCEDURE — 2500000005 HC RX 250 GENERAL PHARMACY W/O HCPCS: Performed by: NURSE PRACTITIONER

## 2025-01-21 PROCEDURE — 82728 ASSAY OF FERRITIN: CPT | Performed by: NURSE PRACTITIONER

## 2025-01-21 PROCEDURE — 93005 ELECTROCARDIOGRAM TRACING: CPT

## 2025-01-21 PROCEDURE — 2500000001 HC RX 250 WO HCPCS SELF ADMINISTERED DRUGS (ALT 637 FOR MEDICARE OP): Performed by: NURSE PRACTITIONER

## 2025-01-21 PROCEDURE — 97535 SELF CARE MNGMENT TRAINING: CPT | Mod: GO

## 2025-01-21 PROCEDURE — 97165 OT EVAL LOW COMPLEX 30 MIN: CPT | Mod: GO

## 2025-01-21 PROCEDURE — 2500000004 HC RX 250 GENERAL PHARMACY W/ HCPCS (ALT 636 FOR OP/ED): Performed by: NURSE PRACTITIONER

## 2025-01-21 PROCEDURE — 97162 PT EVAL MOD COMPLEX 30 MIN: CPT | Mod: GP

## 2025-01-21 PROCEDURE — 2060000001 HC INTERMEDIATE ICU ROOM DAILY

## 2025-01-21 PROCEDURE — 83540 ASSAY OF IRON: CPT | Performed by: NURSE PRACTITIONER

## 2025-01-21 RX ORDER — AMLODIPINE BESYLATE 5 MG/1
5 TABLET ORAL DAILY
Status: DISCONTINUED | OUTPATIENT
Start: 2025-01-21 | End: 2025-01-21

## 2025-01-21 RX ORDER — FERROUS SULFATE 325(65) MG
65 TABLET ORAL
Status: DISCONTINUED | OUTPATIENT
Start: 2025-01-22 | End: 2025-01-24 | Stop reason: HOSPADM

## 2025-01-21 RX ADMIN — CEFTRIAXONE SODIUM 1 G: 1 INJECTION, SOLUTION INTRAVENOUS at 00:05

## 2025-01-21 RX ADMIN — DONEPEZIL HYDROCHLORIDE 5 MG: 5 TABLET ORAL at 21:09

## 2025-01-21 RX ADMIN — Medication 1000 UNITS: at 00:04

## 2025-01-21 RX ADMIN — Medication 10 ML: at 00:09

## 2025-01-21 RX ADMIN — AMLODIPINE BESYLATE 5 MG: 5 TABLET ORAL at 00:05

## 2025-01-21 RX ADMIN — Medication 3 MG: at 21:09

## 2025-01-21 RX ADMIN — ASPIRIN 81 MG: 81 TABLET, COATED ORAL at 09:26

## 2025-01-21 RX ADMIN — Medication 3 MG: at 00:04

## 2025-01-21 RX ADMIN — Medication 1000 UNITS: at 09:26

## 2025-01-21 RX ADMIN — CEFTRIAXONE SODIUM 1 G: 1 INJECTION, SOLUTION INTRAVENOUS at 23:41

## 2025-01-21 RX ADMIN — AMLODIPINE BESYLATE 5 MG: 5 TABLET ORAL at 09:25

## 2025-01-21 RX ADMIN — Medication 10 ML: at 22:32

## 2025-01-21 RX ADMIN — Medication 10 ML: at 15:16

## 2025-01-21 RX ADMIN — Medication 10 ML: at 09:27

## 2025-01-21 SDOH — ECONOMIC STABILITY: FOOD INSECURITY: HOW HARD IS IT FOR YOU TO PAY FOR THE VERY BASICS LIKE FOOD, HOUSING, MEDICAL CARE, AND HEATING?: NOT HARD AT ALL

## 2025-01-21 SDOH — ECONOMIC STABILITY: FOOD INSECURITY: WITHIN THE PAST 12 MONTHS, THE FOOD YOU BOUGHT JUST DIDN'T LAST AND YOU DIDN'T HAVE MONEY TO GET MORE.: NEVER TRUE

## 2025-01-21 SDOH — ECONOMIC STABILITY: HOUSING INSECURITY: IN THE LAST 12 MONTHS, WAS THERE A TIME WHEN YOU WERE NOT ABLE TO PAY THE MORTGAGE OR RENT ON TIME?: NO

## 2025-01-21 SDOH — ECONOMIC STABILITY: FOOD INSECURITY: WITHIN THE PAST 12 MONTHS, YOU WORRIED THAT YOUR FOOD WOULD RUN OUT BEFORE YOU GOT THE MONEY TO BUY MORE.: NEVER TRUE

## 2025-01-21 SDOH — SOCIAL STABILITY: SOCIAL INSECURITY: HAVE YOU HAD THOUGHTS OF HARMING ANYONE ELSE?: NO

## 2025-01-21 SDOH — SOCIAL STABILITY: SOCIAL INSECURITY: ARE THERE ANY APPARENT SIGNS OF INJURIES/BEHAVIORS THAT COULD BE RELATED TO ABUSE/NEGLECT?: NO

## 2025-01-21 SDOH — SOCIAL STABILITY: SOCIAL INSECURITY: WITHIN THE LAST YEAR, HAVE YOU BEEN HUMILIATED OR EMOTIONALLY ABUSED IN OTHER WAYS BY YOUR PARTNER OR EX-PARTNER?: NO

## 2025-01-21 SDOH — ECONOMIC STABILITY: HOUSING INSECURITY: AT ANY TIME IN THE PAST 12 MONTHS, WERE YOU HOMELESS OR LIVING IN A SHELTER (INCLUDING NOW)?: NO

## 2025-01-21 SDOH — SOCIAL STABILITY: SOCIAL INSECURITY: ARE YOU MARRIED, WIDOWED, DIVORCED, SEPARATED, NEVER MARRIED, OR LIVING WITH A PARTNER?: MARRIED

## 2025-01-21 SDOH — HEALTH STABILITY: PHYSICAL HEALTH: ON AVERAGE, HOW MANY DAYS PER WEEK DO YOU ENGAGE IN MODERATE TO STRENUOUS EXERCISE (LIKE A BRISK WALK)?: 0 DAYS

## 2025-01-21 SDOH — HEALTH STABILITY: PHYSICAL HEALTH: ON AVERAGE, HOW MANY MINUTES DO YOU ENGAGE IN EXERCISE AT THIS LEVEL?: 0 MIN

## 2025-01-21 SDOH — SOCIAL STABILITY: SOCIAL INSECURITY: WITHIN THE LAST YEAR, HAVE YOU BEEN AFRAID OF YOUR PARTNER OR EX-PARTNER?: NO

## 2025-01-21 SDOH — SOCIAL STABILITY: SOCIAL INSECURITY
WITHIN THE LAST YEAR, HAVE YOU BEEN RAPED OR FORCED TO HAVE ANY KIND OF SEXUAL ACTIVITY BY YOUR PARTNER OR EX-PARTNER?: NO

## 2025-01-21 SDOH — SOCIAL STABILITY: SOCIAL INSECURITY: HAVE YOU HAD ANY THOUGHTS OF HARMING ANYONE ELSE?: NO

## 2025-01-21 SDOH — SOCIAL STABILITY: SOCIAL INSECURITY
WITHIN THE LAST YEAR, HAVE YOU BEEN KICKED, HIT, SLAPPED, OR OTHERWISE PHYSICALLY HURT BY YOUR PARTNER OR EX-PARTNER?: NO

## 2025-01-21 SDOH — SOCIAL STABILITY: SOCIAL NETWORK: HOW OFTEN DO YOU ATTEND MEETINGS OF THE CLUBS OR ORGANIZATIONS YOU BELONG TO?: NEVER

## 2025-01-21 SDOH — SOCIAL STABILITY: SOCIAL INSECURITY: WERE YOU ABLE TO COMPLETE ALL THE BEHAVIORAL HEALTH SCREENINGS?: YES

## 2025-01-21 SDOH — SOCIAL STABILITY: SOCIAL NETWORK: HOW OFTEN DO YOU GET TOGETHER WITH FRIENDS OR RELATIVES?: MORE THAN THREE TIMES A WEEK

## 2025-01-21 SDOH — ECONOMIC STABILITY: INCOME INSECURITY: IN THE PAST 12 MONTHS HAS THE ELECTRIC, GAS, OIL, OR WATER COMPANY THREATENED TO SHUT OFF SERVICES IN YOUR HOME?: NO

## 2025-01-21 SDOH — SOCIAL STABILITY: SOCIAL INSECURITY: ARE YOU OR HAVE YOU BEEN THREATENED OR ABUSED PHYSICALLY, EMOTIONALLY, OR SEXUALLY BY ANYONE?: NO

## 2025-01-21 SDOH — ECONOMIC STABILITY: HOUSING INSECURITY: IN THE PAST 12 MONTHS, HOW MANY TIMES HAVE YOU MOVED WHERE YOU WERE LIVING?: 1

## 2025-01-21 SDOH — ECONOMIC STABILITY: TRANSPORTATION INSECURITY: IN THE PAST 12 MONTHS, HAS LACK OF TRANSPORTATION KEPT YOU FROM MEDICAL APPOINTMENTS OR FROM GETTING MEDICATIONS?: NO

## 2025-01-21 SDOH — SOCIAL STABILITY: SOCIAL INSECURITY: ABUSE: ADULT

## 2025-01-21 SDOH — SOCIAL STABILITY: SOCIAL INSECURITY: DO YOU FEEL UNSAFE GOING BACK TO THE PLACE WHERE YOU ARE LIVING?: NO

## 2025-01-21 SDOH — SOCIAL STABILITY: SOCIAL INSECURITY: DO YOU FEEL ANYONE HAS EXPLOITED OR TAKEN ADVANTAGE OF YOU FINANCIALLY OR OF YOUR PERSONAL PROPERTY?: NO

## 2025-01-21 SDOH — SOCIAL STABILITY: SOCIAL NETWORK: HOW OFTEN DO YOU ATTEND CHURCH OR RELIGIOUS SERVICES?: 1 TO 4 TIMES PER YEAR

## 2025-01-21 SDOH — SOCIAL STABILITY: SOCIAL INSECURITY: DOES ANYONE TRY TO KEEP YOU FROM HAVING/CONTACTING OTHER FRIENDS OR DOING THINGS OUTSIDE YOUR HOME?: NO

## 2025-01-21 SDOH — SOCIAL STABILITY: SOCIAL INSECURITY: HAS ANYONE EVER THREATENED TO HURT YOUR FAMILY OR YOUR PETS?: NO

## 2025-01-21 ASSESSMENT — COGNITIVE AND FUNCTIONAL STATUS - GENERAL
DAILY ACTIVITIY SCORE: 21
DRESSING REGULAR LOWER BODY CLOTHING: A LITTLE
PERSONAL GROOMING: A LITTLE
HELP NEEDED FOR BATHING: A LITTLE
TOILETING: A LITTLE
EATING MEALS: A LITTLE
MOVING TO AND FROM BED TO CHAIR: A LITTLE
WALKING IN HOSPITAL ROOM: A LITTLE
DAILY ACTIVITIY SCORE: 18
DRESSING REGULAR UPPER BODY CLOTHING: A LITTLE
CLIMB 3 TO 5 STEPS WITH RAILING: A LITTLE
WALKING IN HOSPITAL ROOM: A LITTLE
STANDING UP FROM CHAIR USING ARMS: A LITTLE
CLIMB 3 TO 5 STEPS WITH RAILING: A LOT
TOILETING: A LITTLE
MOVING TO AND FROM BED TO CHAIR: A LITTLE
MOBILITY SCORE: 20
HELP NEEDED FOR BATHING: A LITTLE
MOBILITY SCORE: 20
DRESSING REGULAR LOWER BODY CLOTHING: A LITTLE

## 2025-01-21 ASSESSMENT — ACTIVITIES OF DAILY LIVING (ADL)
PATIENT'S MEMORY ADEQUATE TO SAFELY COMPLETE DAILY ACTIVITIES?: NO
ADEQUATE_TO_COMPLETE_ADL: YES
GROOMING: INDEPENDENT
BATHING_ASSISTANCE: MINIMAL
FEEDING YOURSELF: INDEPENDENT
TOILETING: INDEPENDENT
WALKS IN HOME: INDEPENDENT
HEARING - RIGHT EAR: FUNCTIONAL
HEARING - LEFT EAR: FUNCTIONAL
BATHING: INDEPENDENT
HOME_MANAGEMENT_TIME_ENTRY: 8
JUDGMENT_ADEQUATE_SAFELY_COMPLETE_DAILY_ACTIVITIES: NO
LACK_OF_TRANSPORTATION: NO
DRESSING YOURSELF: INDEPENDENT

## 2025-01-21 ASSESSMENT — LIFESTYLE VARIABLES
PRESCIPTION_ABUSE_PAST_12_MONTHS: NO
HOW MANY STANDARD DRINKS CONTAINING ALCOHOL DO YOU HAVE ON A TYPICAL DAY: 1 OR 2
AUDIT-C TOTAL SCORE: 1
SUBSTANCE_ABUSE_PAST_12_MONTHS: NO
HOW OFTEN DO YOU HAVE A DRINK CONTAINING ALCOHOL: MONTHLY OR LESS
SKIP TO QUESTIONS 9-10: 1
AUDIT-C TOTAL SCORE: 1
HOW OFTEN DO YOU HAVE 6 OR MORE DRINKS ON ONE OCCASION: NEVER

## 2025-01-21 ASSESSMENT — PAIN SCALES - GENERAL
PAINLEVEL_OUTOF10: 0 - NO PAIN

## 2025-01-21 ASSESSMENT — PATIENT HEALTH QUESTIONNAIRE - PHQ9
1. LITTLE INTEREST OR PLEASURE IN DOING THINGS: NOT AT ALL
SUM OF ALL RESPONSES TO PHQ9 QUESTIONS 1 & 2: 0
2. FEELING DOWN, DEPRESSED OR HOPELESS: NOT AT ALL

## 2025-01-21 ASSESSMENT — PAIN - FUNCTIONAL ASSESSMENT
PAIN_FUNCTIONAL_ASSESSMENT: 0-10

## 2025-01-21 NOTE — NURSING NOTE
Seen by attending MD and cardio, neuro and psych consults, they each spent a lot of time talking to sons and .   Patient remains confused but calm, sometimes impulsive in wanting to  get up to bedside commode.

## 2025-01-21 NOTE — H&P
History Of Present Illness  Isela Mcfadden is a 79 y.o. F with PMH of dementia, uterine cancer, s/p INDIRA, remote breast CA, s/p chemo and mastectomy, s/p aortic valve replacement was brought to ER from home due to agitation and delirium.  Patient was apparently wandering around outside in the cold and ice hence was brought to ER.  She is being admitted on RMF for further care.    Patient was recently seen by her psychiatrist and was started on Aricept for visual hallucination which was noticed by her .     History limited due to underlying condition as she is unable to provide any history by herself most of the history was obtained from chart and with my discussion with available family in room.          Past Medical History  She has a past medical history of Adrenal adenoma, left, Aortic stenosis, Chronic anemia, Cognitive communication deficit, Dementia, History of antineoplastic chemotherapy, History of breast cancer, History of COVID-19, History of uterine cancer, Hyperlipidemia, Hypertension, Intention tremor, Interstitial lung disease (Multi), Memory loss, Multiple pulmonary nodules, and Osteopenia.    Surgical History  She has a past surgical history that includes Total abdominal hysterectomy w/ bilateral salpingoophorectomy; Appendectomy; Colonoscopy w/ polypectomy; Mastectomy; Breast reconstruction; Aortic valve replacement; and Dilation and curettage of uterus.     Social History  She reports that she has quit smoking. Her smoking use included cigarettes. She has never used smokeless tobacco. She reports that she does not drink alcohol and does not use drugs.    Family History  Family History   Problem Relation Name Age of Onset    Pancreatic cancer Mother      Esophageal cancer Father      Breast cancer Sister      Heart failure Brother          Allergies  Penicillins and Erythromycin    Current medications:      Current Facility-Administered Medications:     acetaminophen (Tylenol) tablet 650  mg, 650 mg, oral, q4h PRN **OR** acetaminophen (Tylenol) oral liquid 650 mg, 650 mg, oral, q4h PRN **OR** acetaminophen (Tylenol) suppository 650 mg, 650 mg, rectal, q4h PRN, ERIK Lewis    amLODIPine (Norvasc) tablet 5 mg, 5 mg, oral, Daily, ERIK Zavala    [START ON 1/21/2025] aspirin EC tablet 81 mg, 81 mg, oral, Daily, ERIK Zavala    [START ON 1/21/2025] cefTRIAXone (Rocephin) 1 g in dextrose (iso) IV 50 mL, 1 g, intravenous, q24h, ERIK Zavala    cholecalciferol (Vitamin D-3) tablet 1,000 Units, 1,000 Units, oral, Daily, ERIK Zavala    donepezil (Aricept) tablet 5 mg, 5 mg, oral, Nightly, ERIK Zavala    melatonin tablet 3 mg, 3 mg, oral, Nightly PRN, ERIK Lewis    ondansetron ODT (Zofran-ODT) disintegrating tablet 4 mg, 4 mg, oral, q8h PRN **OR** ondansetron (Zofran) injection 4 mg, 4 mg, intravenous, q8h PRN, ERIK Lewis    polyethylene glycol (Glycolax, Miralax) packet 17 g, 17 g, oral, Daily PRN, ERIK Lewis    [START ON 1/23/2025] rosuvastatin (Crestor) tablet 5 mg, 5 mg, oral, Once per day on Sunday Thursday, ERIK Zavala    sodium chloride 0.9% flush 10 mL, 10 mL, intravenous, q8h KENDAL, ERIK Lewis, 10 mL at 01/20/25 1424    Current Outpatient Medications:     aspirin 81 mg EC tablet, Take 1 tablet (81 mg) by mouth once daily., Disp: , Rfl:     nitrofurantoin, macrocrystal-monohydrate, (Macrobid) 100 mg capsule, Take 1 capsule (100 mg) by mouth 2 times a day. Started 1/14/25 x 7 days, Disp: , Rfl:     rosuvastatin (Crestor) 5 mg tablet, Take 1 tablet (5 mg) by mouth 2 times a week. Sunday & Thursday, Disp: , Rfl:     cholecalciferol (Vitamin D-3) 25 MCG (1000 UT) capsule, Take 1 capsule (25 mcg) by mouth once daily., Disp: , Rfl:     donepezil (Aricept) 5 mg tablet, Take 1 tablet (5 mg) by mouth once daily at bedtime., Disp: , Rfl:      "multivitamin tablet, Take 1 tablet by mouth once daily., Disp: , Rfl:        Review of Systems  10 organ ROS is pertinent for history mentioned above, otherwise negative        Physical Exam  HENT:      Head: Normocephalic.      Mouth/Throat:      Pharynx: Oropharynx is clear.   Eyes:      Conjunctiva/sclera: Conjunctivae normal.   Cardiovascular:      Rate and Rhythm: Regular rhythm.   Pulmonary:      Breath sounds: Normal breath sounds.   Abdominal:      General: Bowel sounds are normal.      Palpations: Abdomen is soft.   Musculoskeletal:         General: Normal range of motion.   Skin:     General: Skin is warm and dry.   Neurological:      General: No focal deficit present.   Psychiatric:         Behavior: Behavior normal.          Last Recorded Vitals      Blood pressure (!) 185/86, pulse 92, temperature 36.3 °C (97.3 °F), temperature source Temporal, resp. rate 18, height 1.6 m (5' 3\"), weight 72.6 kg (160 lb), SpO2 98%.    Relevant Results     Results for orders placed or performed during the hospital encounter of 01/19/25 (from the past 24 hours)   CBC and Auto Differential   Result Value Ref Range    WBC 6.0 4.4 - 11.3 x10*3/uL    nRBC 0.0 0.0 - 0.0 /100 WBCs    RBC 3.01 (L) 4.00 - 5.20 x10*6/uL    Hemoglobin 9.1 (L) 12.0 - 16.0 g/dL    Hematocrit 29.2 (L) 36.0 - 46.0 %    MCV 97 80 - 100 fL    MCH 30.2 26.0 - 34.0 pg    MCHC 31.2 (L) 32.0 - 36.0 g/dL    RDW 15.3 (H) 11.5 - 14.5 %    Platelets      Neutrophils % 72.7 40.0 - 80.0 %    Immature Granulocytes %, Automated 0.3 0.0 - 0.9 %    Lymphocytes % 16.6 13.0 - 44.0 %    Monocytes % 8.4 2.0 - 10.0 %    Eosinophils % 1.7 0.0 - 6.0 %    Basophils % 0.3 0.0 - 2.0 %    Neutrophils Absolute 4.33 1.60 - 5.50 x10*3/uL    Immature Granulocytes Absolute, Automated 0.02 0.00 - 0.50 x10*3/uL    Lymphocytes Absolute 0.99 0.80 - 3.00 x10*3/uL    Monocytes Absolute 0.50 0.05 - 0.80 x10*3/uL    Eosinophils Absolute 0.10 0.00 - 0.40 x10*3/uL    Basophils Absolute 0.02 " 0.00 - 0.10 x10*3/uL   Magnesium   Result Value Ref Range    Magnesium 1.96 1.60 - 2.40 mg/dL   Comprehensive metabolic panel   Result Value Ref Range    Glucose 87 74 - 99 mg/dL    Sodium 140 136 - 145 mmol/L    Potassium 4.7 3.5 - 5.3 mmol/L    Chloride 106 98 - 107 mmol/L    Bicarbonate 26 21 - 32 mmol/L    Anion Gap 13 10 - 20 mmol/L    Urea Nitrogen 20 6 - 23 mg/dL    Creatinine 0.87 0.50 - 1.05 mg/dL    eGFR 67 >60 mL/min/1.73m*2    Calcium 9.2 8.6 - 10.3 mg/dL    Albumin 4.6 3.4 - 5.0 g/dL    Alkaline Phosphatase 85 33 - 136 U/L    Total Protein 7.1 6.4 - 8.2 g/dL     (H) 9 - 39 U/L    Bilirubin, Total 1.6 (H) 0.0 - 1.2 mg/dL    ALT 41 7 - 45 U/L   Lactate   Result Value Ref Range    Lactate 1.1 0.4 - 2.0 mmol/L   Ammonia   Result Value Ref Range    Ammonia 28 16 - 53 umol/L   B-Type Natriuretic Peptide   Result Value Ref Range     (H) 0 - 99 pg/mL   TSH with reflex to Free T4 if abnormal   Result Value Ref Range    Thyroid Stimulating Hormone 2.24 0.44 - 3.98 mIU/L   Troponin I, High Sensitivity, Initial   Result Value Ref Range    Troponin I, High Sensitivity 74 (HH) 0 - 13 ng/L   Morphology   Result Value Ref Range    RBC Morphology See Below     RBC Fragments Few     Acanthocytes Few     Giant Platelets Few    ECG 12 lead   Result Value Ref Range    Ventricular Rate 95 BPM    Atrial Rate 95 BPM    HI Interval 184 ms    QRS Duration 90 ms    QT Interval 364 ms    QTC Calculation(Bazett) 457 ms    P Axis 69 degrees    R Axis 10 degrees    T Axis 74 degrees    QRS Count 16 beats    Q Onset 226 ms    P Onset 134 ms    P Offset 190 ms    T Offset 408 ms    QTC Fredericia 424 ms   Blood Culture    Specimen: Peripheral Venipuncture; Blood culture   Result Value Ref Range    Blood Culture Loaded on Instrument - Culture in progress    Influenza A, and B PCR   Result Value Ref Range    Flu A Result Not Detected Not Detected    Flu B Result Not Detected Not Detected   Sars-CoV-2 PCR   Result Value Ref  Range    Coronavirus 2019, PCR Not Detected Not Detected   Blood Culture    Specimen: Peripheral Venipuncture; Blood culture   Result Value Ref Range    Blood Culture Loaded on Instrument - Culture in progress    Urinalysis with Reflex Culture and Microscopic   Result Value Ref Range    Color, Urine Yellow Light-Yellow, Yellow, Dark-Yellow    Appearance, Urine Clear Clear    Specific Gravity, Urine 1.016 1.005 - 1.035    pH, Urine 6.5 5.0, 5.5, 6.0, 6.5, 7.0, 7.5, 8.0    Protein, Urine 10 (TRACE) NEGATIVE, 10 (TRACE), 20 (TRACE) mg/dL    Glucose, Urine Normal Normal mg/dL    Blood, Urine 0.2 (2+) (A) NEGATIVE    Ketones, Urine NEGATIVE NEGATIVE mg/dL    Bilirubin, Urine NEGATIVE NEGATIVE    Urobilinogen, Urine Normal Normal mg/dL    Nitrite, Urine NEGATIVE NEGATIVE    Leukocyte Esterase, Urine 75 Medina/uL (A) NEGATIVE   Extra Urine Gray Tube   Result Value Ref Range    Extra Tube Hold for add-ons.    Microscopic Only, Urine   Result Value Ref Range    WBC, Urine 1-5 1-5, NONE /HPF    RBC, Urine 1-2 NONE, 1-2, 3-5 /HPF   Troponin, High Sensitivity, 1 Hour   Result Value Ref Range    Troponin I, High Sensitivity 91 (HH) 0 - 13 ng/L   Troponin I, High Sensitivity   Result Value Ref Range    Troponin I, High Sensitivity 70 (HH) 0 - 13 ng/L   TSH   Result Value Ref Range    Thyroid Stimulating Hormone 1.85 0.44 - 3.98 mIU/L   Vitamin B12   Result Value Ref Range    Vitamin B12 283 211 - 911 pg/mL          Radiology  CT HEAD WO IV CONTRAST;  1/19/2025 11:00 pm      INDICATION:  Signs/Symptoms:worsening confusion.      COMPARISON:  None      ACCESSION NUMBER(S):  [id number]      ORDERING CLINICIAN:  ISRAEL RAMOS      TECHNIQUE:  Contiguous axial images of the head were obtained without intravenous  contrast.      FINDINGS:  BRAIN PARENCHYMA:  There is cerebral atrophy and chronic  periventricular white matter small vessel ischemic change. The gray  white matter differentiation is preserved.  No mass effect or  midline  shift.      HEMORRHAGE:  No evidence of acute intracranial hemorrhage.  VENTRICLES AND EXTRA-AXIAL SPACES:  The ventricles are within normal  limits in size for brain volume.  No evidence of abnormal extraaxial  fluid collection. EXTRACRANIAL SOFT TISSUES:  Within normal limits.  PARANASAL SINUSES/MASTOIDS:  The visualized paranasal sinuses and  mastoid air cells are clear and well pneumatized. CALVARIUM:  No  evidence of depressed calvarial fracture.      OTHER FINDINGS:  None      IMPRESSION:  Cerebral atrophy and chronic periventricular white matter small  vessel ischemic change.          Assessment/Plan   Assessment & Plan  Delirium     Confusion and disorientation    Wandering behavior     Elevated troponin     Recently dx UTI      PLAN: Patient being admitted on RMF, was started on IV CTX.  Per family she was started on Macrobid as an outpatient which is now being stopped.  Patient is requesting psych consult,, patient may need to go to [facility] if no improvement, DVT prophylaxis, discussed with CNP, follow closely.           Darrell Miller MD

## 2025-01-21 NOTE — NURSING NOTE
THE PT WAS BROUGHT UP FROM THE ER TO  2103 WITH FAMILY. THE NS SUPERVISOR WAS CALLED FOR A BED TO TRANSFER THE PT IN. THE PT WAS SENT WITH NO REPORT. THE ER WAS CALLED WITHOUT A RESPONSE.  THE PT WAS KEPT ON THE ER CART AT THIS TIME PENDING A BED. THE PT IS AWAKE, ALERT. CONFUSED. ORIENTED X1. DENIES HAVING ANY CP, HA, NAUSEA, DIZZINESS , ABD PAIN  AND/OR ANY  DISCOMFORT.  AT TIMES HER SPEECH IS CLEAR AND FLUENT. SHE HAS A TENDENDENCY AND  LOOKS TO HER FAMILY WHEN UNSURE OF THE ANSWER TO A  QUESTIONS. OBSERVED TO HAVE SOME EXPRESSIVE APHASIA AT TIMES WHEN QUESTIONED. . APPEARS  COOPERATIVE AND DIRECTABLE. DENIES ANY N/T X4. NO HA. BLURRED VISION. EOMS ARE INTACT.  ALTON. NO ARM OR LEG DRIFTS NOTED.  STRONG TO MOVE X4 EXT. RESPS ARE EVEN AND UNLABORED ON RA. BS ARE CTA. EKG-NSR W/O VENT. ECTOPY. ABD IS SOFT AND NON TENDER TO PALPATE. BS + . HER  STATED SHE WAS ABLE TO DRESS AND BATH HERSELF INDEPENDENTLY AT HOME. WAS JUST DX LAST WEEK WITH MILD DEMENTIA . WAS WARNED BY HER FAMILY THAT THE PT CAN HAVE QUICK MOOD SWINGS AND HAD BEEN AGITATED AND COMBATIVE IN THE ER. THE PT HAS NO PERIPHERAL EDEMA. PULSES ARE PALPABLE. SHE APPEARS PALE IN COLORING. THE PT IS VOICING FATIGUE. WAS ALSO TOLD BY THE FAMILY THE PT HAD URINATED PER A BSC JUST BEFORE COMING UP FROM THE EMERGENCY RM. NO DISTRESS IS OBSERVED AT THIS TIME. BOTH THE PT AND FAMILY WERE ORIENTED TO THE , BS EQUIPMENT AND POC.   2230- ADMISSION DATA BASE INFORMATION WAS OBTAINED FROM THE PTS FAMILY. THE PT AMBULATED FROM THE CART TO THE BED WITH A STEADY GAIT. SHE HAD A SM SCABBED ABRASION ON THE LT OUTER KNEE. OTHERWISE HER SKIN WAS INTACT. SHE LIKES TO HOLD ON TO STAFF WHEN AMBULATING FOR SECURITY. DOES NOT USE ASSISTIVE DEVICES AT HOME. WENT RIGHT TO SLEEP AFTER PM CARE WAS GIVEN AND READIED FOR BED. DENIESD ANY BACK PAIN OR NECK PAIN AT THIS TIME.   2300- THE PTS  WAS TAKEN BY WHEELCHAIR TO HIS CAR IN THE ER PARKING LOT. THE PTS ONE SON IS  STAYING THE NIGHT AT HIS MOMS BEDSIDE.   STATED DR VILLATORO HAD SEEN THE PT IN THE ER AND FELT THE PT CONTINUED TO HAVE A UTI AND THAT HER CULTURE WAS STILL PENDING BUT THAT SHE WOULD RECIEVE IV ANTIBIOTICS. ASKING WHAT TIME AND DOCUMENTING EVERYTHING  SAID IN HIS NOTEBOOK. COMFORT MEASURES PROVIDED.  000- THE PTS SON REFUSED THE PTS PM DOSE OF ARICEPT. STATED HER MD WAS AWARE. IV ROCEPHIN WAS GIVEN ALONG WITH HER PM MEDS. SHE IS SWALLOWING WELL AND W/O DIFFICULTY. DENIED FEELING HUNGRY. PO FLUIDS ENC. WHEN AWAKE.  0200- SPOKE WITH [facility]. MADE AWARE THE PT HAD VOIDED IN THE ER. ORDERS FOR STRAIGHT CATHING THE PT WERE DISCONTINUED.   0400- BOTH THE PT AND HER SON WERE ASLEEP ON AM ROUNDS. THE PT AROUSES EASILY. REMAINS PLEASANTLY CONFUSED BUT COOPERATIVE AND DIRECTABLE. THE PT  WAS NOTED TO BE INCONTINENT OF URINE. THE PT WAS GOTTEN OOB TO THE BSC X1 ASSIST. . AM CARE AND HYGENIC MEASURES WERE PERFORMED. THEN THE PT WAS ESCORTED BACK TO BED W/O INCIDENCE. THE PT CONTINUES TO BE CONFUSED AS TO TIME, PLACE SITUATION AND SELF . VSS. TEMP AFEBRILE. NO PAIN WAS VOICED AND SHE VOIDED A LG QUANTITY OF URINE. AGAIN NO DISTRESS OBSERVED. HER NEURO EXAM APPEARS UNCHANGED AT THIS TIME. NO HALLUCINATIONS OBSERVED OVERNIGHT.  0700- PT SAFETY WAS MAINTAINED. THE BED ALARM REMAINS ON. HER SON REMAINS AT THE BEDSIDE. WAITING ON HIS DAD TO RELIEVE HIM  WITH HIS MOM.

## 2025-01-21 NOTE — PROGRESS NOTES
"Nutrition Initial Assessment:   Nutrition Assessment    Reason for Assessment: Admission nursing screening    Nutrition Note:  Isela Mcfadden is a 79 y.o. female presenting 1/19 with altered mental status/hallucinations; Neurology involved in pt care. Pt family requesting placement.     Past Medical History   has a past medical history of Adrenal adenoma, left, Aortic stenosis, Chronic anemia, Cognitive communication deficit, Dementia, History of antineoplastic chemotherapy, History of breast cancer, History of COVID-19, History of uterine cancer, Hyperlipidemia, Hypertension, Intention tremor, Interstitial lung disease (Multi), Memory loss, Multiple pulmonary nodules, and Osteopenia.  Surgical History   has a past surgical history that includes Total abdominal hysterectomy w/ bilateral salpingoophorectomy; Appendectomy; Colonoscopy w/ polypectomy; Mastectomy; Breast reconstruction; Aortic valve replacement; and Dilation and curettage of uterus.       Nutrition History:  Energy Intake: Fair 50-75 %, Good > 75 %  Food and Nutrient History: Pt from home with ; family nearby. NKFA. Good appetite PTA and present; family ordering pt meals  Vitamin/Herbal Supplement Use: home meds include D3 and MVI  Food Allergies/Intolerances:  None  GI Symptoms: None  Oral Problems: None       Anthropometrics:  Height: 160 cm (5' 2.99\")   Weight: 61.6 kg (135 lb 12.9 oz)   BMI (Calculated): 24.06         0-10 (Numeric) Pain Score: 0 - No pain     Weight History:   1/14/2025: 60.2kg  11/2024: 61.1kg  10/2024: 61.3kg  7/2024: 62.2kg  2/2024: 62.1kg  Weight Change %:   Significant Weight Loss: No    Nutrition Focused Physical Exam Findings:  defer: 1/21  Subcutaneous Fat Loss:      Muscle Wasting:     Edema:  Edema: none  Physical Findings:  Skin: Negative (bruising)    Nutrition Significant Labs:  BMP Trend:   Results from last 7 days   Lab Units 01/21/25  0506 01/19/25  2211   GLUCOSE mg/dL 86 87   CALCIUM mg/dL 8.9 9.2 "   SODIUM mmol/L 140 140   POTASSIUM mmol/L 3.9 4.7   CO2 mmol/L 27 26   CHLORIDE mmol/L 107 106   BUN mg/dL 10 20   CREATININE mg/dL 0.72 0.87    , A1C:  Lab Results   Component Value Date    HGBA1C 5.2 03/18/2024   , BG POCT trend:    , Liver Function Trend:   Results from last 7 days   Lab Units 01/21/25  0506 01/19/25  2211   ALK PHOS U/L 59 85   AST U/L 52* 103*   ALT U/L 29 41   BILIRUBIN TOTAL mg/dL 0.9 1.6*        Nutrition Specific Medications:  Scheduled medications  amLODIPine, 5 mg, oral, Daily  aspirin, 81 mg, oral, Daily  cefTRIAXone, 1 g, intravenous, q24h  cholecalciferol, 1,000 Units, oral, Daily  donepezil, 5 mg, oral, Nightly  [START ON 1/22/2025] ferrous sulfate (325 mg ferrous sulfate), 65 mg of iron, oral, Daily with breakfast  [START ON 1/23/2025] rosuvastatin, 5 mg, oral, Once per day on Sunday Thursday  sodium chloride 0.9%, 10 mL, intravenous, q8h KENDAL      Continuous medications     PRN medications  PRN medications: acetaminophen **OR** acetaminophen **OR** acetaminophen, melatonin, ondansetron ODT **OR** ondansetron, polyethylene glycol     I/O:   Last BM Date: 01/19/25;      Dietary Orders (From admission, onward)       Start     Ordered    01/20/25 2302  May Participate in Room Service With Assistance  ( ROOM SERVICE MAY PARTICIPATE WITH ASSISTANCE)  Once        Question:  .  Answer:  Yes    01/20/25 2301 01/20/25 0359  Adult diet Regular  Diet effective now        Question:  Diet type  Answer:  Regular    01/20/25 0359                     Estimated Needs:   Total Energy Estimated Needs (kCal):  (1500-1800kcal (25-30kcal/kg of 61.6kg))     Total Protein Estimated Needs (g):  (62-74g (1-1.2g/kg of 61.6kg))     Total Fluid Estimated Needs (mL):  (1mL/kcal/d or as per physician)           Nutrition Diagnosis   Malnutrition Diagnosis  Patient has Malnutrition Diagnosis: No    Nutrition Diagnosis  Patient has Nutrition Diagnosis: Yes  Diagnosis Status (1): New  Nutrition Diagnosis 1:  Inability to manage self care  Related to (1): altered mental status  As Evidenced by (1): memory decline/dementia; pt awaiting nursing facility placement.  Additional Assessment Information (1): 1/21: Case discussed with nurse; plan for discharge to facility 1/23. Pt consumed 100% of breakfast and lunch--family ordering pt meals.       Nutrition Interventions/Recommendations         Nutrition Prescription:  Individualized Nutrition Prescription Provided for : continue regular diet        Nutrition Interventions:   Interventions: Meals and snacks  Meals and Snacks: General healthful diet  Goal: continue regular diet as ordered  Additional Interventions: family ordering pt meals    Collaboration and Referral of Nutrition Care: Collaboration by nutrition professional with other providers  Goal: ISMAEL Ramirez    Nutrition Education:   1/21: Met with pt and family at bedside. AYR briefly reviewed; offered to take dinner order however  states will complete later. Son relates just had a meeting with physician and now family wishes to discuss in private. No further MNT needs identified at present time.        Nutrition Monitoring and Evaluation   Food/Nutrient Related History Monitoring  Monitoring and Evaluation Plan: Energy intake  Energy Intake: Estimated energy intake  Criteria: >75% of estimated nutrient needs via meals and PRN snacks.    Body Composition/Growth/Weight History  Monitoring and Evaluation Plan: Weight  Weight: Measured weight  Criteria: maintain stable weight    Biochemical Data, Medical Tests and Procedures  Monitoring and Evaluation Plan: Electrolyte/renal panel  Electrolyte and Renal Panel: Magnesium, Phosphorus, Potassium, Sodium  Criteria: lytes WNR              Time Spent (min): 30 minutes

## 2025-01-21 NOTE — PROGRESS NOTES
Physical Therapy    Physical Therapy Evaluation    Patient Name: Isela Mcfadden  MRN: [id number]  Today's Date: 1/21/2025   Time Calculation  Start Time: 1204  Stop Time: 1226  Time Calculation (min): 22 min  2103/2103-A    Assessment/Plan   PT Assessment: Pt demonstrates impairments listed below.  Pt appears below baseline level of function and based on current level of function, pt would benefit from continued skilled therapy while in the hospital to ensure safety, decrease risk of falls, and regain strength/mobility back to baseline.  Once stable enough for discharge, pt would benefit from low versus moderate intensity with 24 hr supervision 2/2 cognition and lack of safety awareness.     PT Assessment Results: Decreased strength, Impaired balance, Decreased mobility, Impaired judgement, Decreased safety awareness, Decreased cognition  Rehab Prognosis: Fair (2/2 cognition)  Barriers to Discharge Home: Cognition needs  Cognition Needs: 24hr supervision for safety awareness needed, Insight of patient limited regarding functional ability/needs, Cognition-related high falls risk  Evaluation/Treatment Tolerance: Patient tolerated treatment well  Medical Staff Made Aware: Yes  End of Session Communication: Bedside nurse  End of Session Patient Position: Bed, 3 rail up, Alarm on ( and sons present)  IP OR SWING BED PT PLAN  Inpatient or Swing Bed: Inpatient  PT Plan  Treatment/Interventions: Bed mobility, Transfer training, Gait training, Stair training, Balance training, Neuromuscular re-education, Strengthening, Therapeutic exercise, Therapeutic activity, Home exercise program  PT Plan: Ongoing PT  PT Frequency: 2 times per week  PT Discharge Recommendations:  (low vs moderate intensity with 24 hr supervision 2/2 safety and cognitive needs)  PT Recommended Transfer Status: Contact guard  PT - OK to Discharge: Yes - To next level of care when cleared by medical team    Subjective     Current Problem:  1.  Confusion and disorientation            Past Medical History:  Patient Active Problem List   Diagnosis    Confusion and disorientation    Delirium    Wandering behavior    Elevated troponin       General Visit Information:  Per EMR: pt presenting to [facility] on 1/19/2025 with complaint of delirium.     Chart review indicates that Isela and her  Benito saw her primary care provider earlier this month on the 14th to discuss the fact that Isela has started having visual hallucinations, seeing and speaking to people who were not there, worsening memory deficits, and a tremor with intention.  She has been diagnosed with dementia for about a year but her symptoms seem to have been worsening for the past few weeks.  Her primary care provider ordered an MRI of her brain without contrast which returned unremarkable for acute issue.  She is also currently on Macrobid for suspected urinary tract infection though I am unable to find a urinalysis or culture to corroborate.     She comes in tonight with EMS after having an episode of wandering out of her home.  She was reportedly found outside by one of her neighbors who noted that she was barefoot and seemed confused.  The neighbor brought her to her son's house and Isela reportedly did not recognize her son or her  which is new for her.  It seems as though someone called her PCPs nurse triage line and recommendation was made to summon EMS which was done.     Labs tonight are noteworthy for initial troponin of 74 with recheck 91, BNP of 350 without evidence of fluid overload on exam, urinalysis with a trace of leukocyte esterase, and relatively stable chronic anemia with hemoglobin 9.1.  Imaging is unremarkable for acute issue.     On exam Isela is confused and disoriented but has no real complaints.  She says she believes that people have been intruding into her home and she mention to emergency department staff that she might be aware that these are  visual hallucinations.     Plan is to admit for further evaluation by psychiatry and perhaps neurology at her attending physicians discretion.  Further investigation into other potential causes of her worsening delirium with episode of wandering is underway.    On arrival, pt supine in bed.  Pt in no apparent distress and agreeable to therapy.    General  Reason for Referral: impaired mobility, gait training, impaired cognition/safety awareness  Referred By: Darrell Miller  Family/Caregiver Present: Yes  Caregiver Feedback: son present on arrival; another son and  in hallway during session  Co-Treatment: OT  Prior to Session Communication: Bedside nurse  Patient Position Received: Bed, 3 rail up, Alarm on    Home Living/PLOF:  History obtained from son 2/2 decreased cognition.  Pt lives in house with  with 0 JAN through front door and 1 JAN through garage.  Bedroom and full baht on main level.  Has WIS with Gbs and seat.  IND with ADLs and mobility without device.  Has 2nd floor 12 steps up with rail, but only occasionally uses this level.  Pt's son denies any falls for the pt.  Pt does not drive, but  does.     Precautions:  Precautions  Medical Precautions: Fall precautions    Objective     Pain:  Pain Assessment  Pain Assessment: 0-10  0-10 (Numeric) Pain Score: 0 - No pain    Cognition:  Cognition  Overall Cognitive Status: Impaired  Orientation Level: Disoriented to situation, Disoriented to time (pt unable to state current year)  Safety/Judgement:  (impaired)  Insight: Moderate  Impulsive: Mildly  Processing Speed: Delayed    General Assessments:      Activity Tolerance  Endurance: Endurance does not limit participation in activity  Sensation  Sensation Comment: pt denies any numbness/tingling    Static Sitting Balance  Static Sitting-Comment/Number of Minutes: good  Dynamic Sitting Balance  Dynamic Sitting-Comments: good  Static Standing Balance  Static Standing-Comment/Number of  Minutes: good  Dynamic Standing Balance  Dynamic Standing-Comments: fair plus    Extremity/Trunk Assessments:  BLE strength: appears WFL    Functional Mobility:  Bed mobility  Supine to sit: SUP  Sit to supine: SUP    Transfers  Sit to stand: SBA  Stand to sit: SBA  Toilet transfer: Min A x1-2 on/off standard toilet; IND nuzhat care in standing with CGA for stand balance     Ambulation/Stairs  Pt ambulated 10 ft x2 with SBA-CGA and 1 HHA; did not required HHA on 10 bout of ambulation; improved balance noted    Outcome Measures:  [facility] Basic Mobility  Turning from your back to your side while in a flat bed without using bedrails: None  Moving from lying on your back to sitting on the side of a flat bed without using bedrails: None  Moving to and from bed to chair (including a wheelchair): A little  Standing up from a chair using your arms (e.g. wheelchair or bedside chair): A little  To walk in hospital room: A little  Climbing 3-5 steps with railing: A little  Basic Mobility - Total Score: 20    Goals:  Encounter Problems       Encounter Problems (Active)       PT Problem       Pt will be able to perform all bed mobility tasks with Mod I.  (Progressing)       Start:  01/21/25    Expected End:  02/04/25            Pt will perform all transfers with IND with proper safety mechanics.   (Progressing)       Start:  01/21/25    Expected End:  02/04/25            Pt will ambulate 150 ft with IND for improved functional independence.  (Progressing)       Start:  01/21/25    Expected End:  02/04/25            Pt will be able to negotiate 2 steps with 1 HR with Mod I.  (Progressing)       Start:  01/21/25    Expected End:  02/04/25                 Education Documentation  Body Mechanics, taught by Janny Mejía, PT at 1/21/2025  2:44 PM.  Learner: Patient  Readiness: Acceptance  Method: Explanation  Response: Verbalizes Understanding, Needs Reinforcement, No Evidence of Learning    Mobility Training, taught by Janny GARCIA  Alfonzo PT at 1/21/2025  2:44 PM.  Learner: Patient  Readiness: Acceptance  Method: Explanation  Response: Verbalizes Understanding, Needs Reinforcement, No Evidence of Learning    Education Comments  No comments found.

## 2025-01-21 NOTE — CONSULTS
Reason for consult:  Altered mental status    History Of Present Illness  Isela Mcfadden is a 79 y.o. female presenting with AMS and wandering behavior.  Chart review indicates that Isela and her  Benito saw her primary care provider earlier this month on the 14th to discuss the fact that Isela has started having visual hallucinations, seeing and speaking to people who were not there, worsening memory deficits, and a tremor with intention.  She has been diagnosed with dementia for about a year but her symptoms seem to have been worsening for the past few weeks.  Her primary care provider ordered an MRI of her brain without contrast which returned unremarkable for acute issue.  She is also currently on Macrobid for suspected urinary tract infection though I am unable to find a urinalysis or culture to corroborate.     She comes in tonight with EMS after having an episode of wandering out of her home.  She was reportedly found outside by one of her neighbors who noted that she was barefoot and seemed confused.  The neighbor brought her to her son's house and Isela reportedly did not recognize her son or her  which is new for her.  It seems as though someone called her PCPs nurse triage line and recommendation was made to summon EMS which was done.    Above information was confirmed by talking with her son Dallin,  and Cowan her other son were at the bedside.   Dallin reported that he has been seeing a significant decline in her cognition since the beginning of this year with worsening confusion and wandering behavior.  She also has been hallucinating visually and auditory and paranoid against her .  Patient admits to having memory difficulties.  She is alert and oriented x 1.     Patient was recently started by her doctor Aricept for memory decline.  This was stopped yesterday at the request of the family due to the concern that Aricept could have contributed to the worsening  "psychosis symptoms    Patient denies any depression or suicidal thoughts.  Patient is markedly anxious.  She is not agitated and has been cooperative with care.  She wants to go back home    Past Medical History  She has a past medical history of Adrenal adenoma, left, Aortic stenosis, Chronic anemia, Cognitive communication deficit, Dementia, History of antineoplastic chemotherapy, History of breast cancer, History of COVID-19, History of uterine cancer, Hyperlipidemia, Hypertension, Intention tremor, Interstitial lung disease (Multi), Memory loss, Multiple pulmonary nodules, and Osteopenia.    Surgical History  She has a past surgical history that includes Total abdominal hysterectomy w/ bilateral salpingoophorectomy; Appendectomy; Colonoscopy w/ polypectomy; Mastectomy; Breast reconstruction; Aortic valve replacement; and Dilation and curettage of uterus.     Social History  She reports that she has quit smoking. Her smoking use included cigarettes. She has never used smokeless tobacco. She reports that she does not drink alcohol and does not use drugs.     Allergies  Penicillins and Erythromycin      Mental Status Exam  Alert and oriented x 1 cooperative decreased psychomotor activity speech is decreased in rate spontaneous with word finding difficulty for which she has been seeing the speech therapist for past few years.  Mood appeared to be anxious but not depressed patient has concrete thinking.  She has paranoid thoughts with visual and auditory hallucinations.  Judgment and insight is impaired cognition is impaired      Psychiatric Risk Assessment  High risk of sundowning and agitation    Last Recorded Vitals  Blood pressure 140/64, pulse 84, temperature 36.3 °C (97.3 °F), temperature source Temporal, resp. rate 26, height 1.6 m (5' 2.99\"), weight 61.6 kg (135 lb 12.9 oz), SpO2 98%.    Relevant Results  Results for orders placed or performed during the hospital encounter of 01/19/25 (from the past 96 hours) "   CBC and Auto Differential   Result Value Ref Range    WBC 6.0 4.4 - 11.3 x10*3/uL    nRBC 0.0 0.0 - 0.0 /100 WBCs    RBC 3.01 (L) 4.00 - 5.20 x10*6/uL    Hemoglobin 9.1 (L) 12.0 - 16.0 g/dL    Hematocrit 29.2 (L) 36.0 - 46.0 %    MCV 97 80 - 100 fL    MCH 30.2 26.0 - 34.0 pg    MCHC 31.2 (L) 32.0 - 36.0 g/dL    RDW 15.3 (H) 11.5 - 14.5 %    Platelets      Neutrophils % 72.7 40.0 - 80.0 %    Immature Granulocytes %, Automated 0.3 0.0 - 0.9 %    Lymphocytes % 16.6 13.0 - 44.0 %    Monocytes % 8.4 2.0 - 10.0 %    Eosinophils % 1.7 0.0 - 6.0 %    Basophils % 0.3 0.0 - 2.0 %    Neutrophils Absolute 4.33 1.60 - 5.50 x10*3/uL    Immature Granulocytes Absolute, Automated 0.02 0.00 - 0.50 x10*3/uL    Lymphocytes Absolute 0.99 0.80 - 3.00 x10*3/uL    Monocytes Absolute 0.50 0.05 - 0.80 x10*3/uL    Eosinophils Absolute 0.10 0.00 - 0.40 x10*3/uL    Basophils Absolute 0.02 0.00 - 0.10 x10*3/uL   Magnesium   Result Value Ref Range    Magnesium 1.96 1.60 - 2.40 mg/dL   Comprehensive metabolic panel   Result Value Ref Range    Glucose 87 74 - 99 mg/dL    Sodium 140 136 - 145 mmol/L    Potassium 4.7 3.5 - 5.3 mmol/L    Chloride 106 98 - 107 mmol/L    Bicarbonate 26 21 - 32 mmol/L    Anion Gap 13 10 - 20 mmol/L    Urea Nitrogen 20 6 - 23 mg/dL    Creatinine 0.87 0.50 - 1.05 mg/dL    eGFR 67 >60 mL/min/1.73m*2    Calcium 9.2 8.6 - 10.3 mg/dL    Albumin 4.6 3.4 - 5.0 g/dL    Alkaline Phosphatase 85 33 - 136 U/L    Total Protein 7.1 6.4 - 8.2 g/dL     (H) 9 - 39 U/L    Bilirubin, Total 1.6 (H) 0.0 - 1.2 mg/dL    ALT 41 7 - 45 U/L   Lactate   Result Value Ref Range    Lactate 1.1 0.4 - 2.0 mmol/L   Ammonia   Result Value Ref Range    Ammonia 28 16 - 53 umol/L   B-Type Natriuretic Peptide   Result Value Ref Range     (H) 0 - 99 pg/mL   TSH with reflex to Free T4 if abnormal   Result Value Ref Range    Thyroid Stimulating Hormone 2.24 0.44 - 3.98 mIU/L   Troponin I, High Sensitivity, Initial   Result Value Ref Range     Troponin I, High Sensitivity 74 (HH) 0 - 13 ng/L   Morphology   Result Value Ref Range    RBC Morphology See Below     RBC Fragments Few     Acanthocytes Few     Giant Platelets Few    ECG 12 lead   Result Value Ref Range    Ventricular Rate 95 BPM    Atrial Rate 95 BPM    VA Interval 184 ms    QRS Duration 90 ms    QT Interval 364 ms    QTC Calculation(Bazett) 457 ms    P Axis 69 degrees    R Axis 10 degrees    T Axis 74 degrees    QRS Count 16 beats    Q Onset 226 ms    P Onset 134 ms    P Offset 190 ms    T Offset 408 ms    QTC Fredericia 424 ms   Blood Culture    Specimen: Peripheral Venipuncture; Blood culture   Result Value Ref Range    Blood Culture No growth at 1 day    Influenza A, and B PCR   Result Value Ref Range    Flu A Result Not Detected Not Detected    Flu B Result Not Detected Not Detected   Sars-CoV-2 PCR   Result Value Ref Range    Coronavirus 2019, PCR Not Detected Not Detected   Blood Culture    Specimen: Peripheral Venipuncture; Blood culture   Result Value Ref Range    Blood Culture No growth at 1 day    Urinalysis with Reflex Culture and Microscopic   Result Value Ref Range    Color, Urine Yellow Light-Yellow, Yellow, Dark-Yellow    Appearance, Urine Clear Clear    Specific Gravity, Urine 1.016 1.005 - 1.035    pH, Urine 6.5 5.0, 5.5, 6.0, 6.5, 7.0, 7.5, 8.0    Protein, Urine 10 (TRACE) NEGATIVE, 10 (TRACE), 20 (TRACE) mg/dL    Glucose, Urine Normal Normal mg/dL    Blood, Urine 0.2 (2+) (A) NEGATIVE    Ketones, Urine NEGATIVE NEGATIVE mg/dL    Bilirubin, Urine NEGATIVE NEGATIVE    Urobilinogen, Urine Normal Normal mg/dL    Nitrite, Urine NEGATIVE NEGATIVE    Leukocyte Esterase, Urine 75 [place]/uL (A) NEGATIVE   Extra Urine Gray Tube   Result Value Ref Range    Extra Tube Hold for add-ons.    Microscopic Only, Urine   Result Value Ref Range    WBC, Urine 1-5 1-5, NONE /HPF    RBC, Urine 1-2 NONE, 1-2, 3-5 /HPF   Urine Culture    Specimen: Clean Catch/Voided; Urine   Result Value Ref Range     Urine Culture       Growth indicates contamination with periurethral jami. Repeat culture if clinically indicated.   Troponin, High Sensitivity, 1 Hour   Result Value Ref Range    Troponin I, High Sensitivity 91 (HH) 0 - 13 ng/L   Troponin I, High Sensitivity   Result Value Ref Range    Troponin I, High Sensitivity 70 (HH) 0 - 13 ng/L   TSH   Result Value Ref Range    Thyroid Stimulating Hormone 1.85 0.44 - 3.98 mIU/L   Vitamin B12   Result Value Ref Range    Vitamin B12 283 211 - 911 pg/mL   Comprehensive metabolic panel   Result Value Ref Range    Glucose 86 74 - 99 mg/dL    Sodium 140 136 - 145 mmol/L    Potassium 3.9 3.5 - 5.3 mmol/L    Chloride 107 98 - 107 mmol/L    Bicarbonate 27 21 - 32 mmol/L    Anion Gap 10 10 - 20 mmol/L    Urea Nitrogen 10 6 - 23 mg/dL    Creatinine 0.72 0.50 - 1.05 mg/dL    eGFR 84 >60 mL/min/1.73m*2    Calcium 8.9 8.6 - 10.3 mg/dL    Albumin 3.3 (L) 3.4 - 5.0 g/dL    Alkaline Phosphatase 59 33 - 136 U/L    Total Protein 5.3 (L) 6.4 - 8.2 g/dL    AST 52 (H) 9 - 39 U/L    Bilirubin, Total 0.9 0.0 - 1.2 mg/dL    ALT 29 7 - 45 U/L   CBC and Auto Differential   Result Value Ref Range    WBC 4.9 4.4 - 11.3 x10*3/uL    nRBC 0.0 0.0 - 0.0 /100 WBCs    RBC 2.62 (L) 4.00 - 5.20 x10*6/uL    Hemoglobin 7.9 (L) 12.0 - 16.0 g/dL    Hematocrit 25.5 (L) 36.0 - 46.0 %    MCV 97 80 - 100 fL    MCH 30.2 26.0 - 34.0 pg    MCHC 31.0 (L) 32.0 - 36.0 g/dL    RDW 15.2 (H) 11.5 - 14.5 %    Platelets 118 (L) 150 - 450 x10*3/uL    Neutrophils % 66.2 40.0 - 80.0 %    Immature Granulocytes %, Automated 0.2 0.0 - 0.9 %    Lymphocytes % 19.8 13.0 - 44.0 %    Monocytes % 8.5 2.0 - 10.0 %    Eosinophils % 4.7 0.0 - 6.0 %    Basophils % 0.6 0.0 - 2.0 %    Neutrophils Absolute 3.27 1.60 - 5.50 x10*3/uL    Immature Granulocytes Absolute, Automated 0.01 0.00 - 0.50 x10*3/uL    Lymphocytes Absolute 0.98 0.80 - 3.00 x10*3/uL    Monocytes Absolute 0.42 0.05 - 0.80 x10*3/uL    Eosinophils Absolute 0.23 0.00 - 0.40 x10*3/uL     Basophils Absolute 0.03 0.00 - 0.10 x10*3/uL   Iron and TIBC   Result Value Ref Range    Iron 34 (L) 35 - 150 ug/dL    UIBC 215 110 - 370 ug/dL    TIBC 249 240 - 445 ug/dL    % Saturation 14 (L) 25 - 45 %   Ferritin   Result Value Ref Range    Ferritin 114 8 - 150 ng/mL     ECG 12 lead    Result Date: 1/20/2025  Normal sinus rhythm Possible Left atrial enlargement Borderline ECG When compared with ECG of 01-AUG-2002 07:05, Nonspecific T wave abnormality no longer evident in Inferior leads    XR chest 1 view    Result Date: 1/19/2025  Interpreted By:  Mert Batres, STUDY: XR CHEST 1 VIEW;  1/19/2025 10:41 pm   INDICATION: Signs/Symptoms:ams.   COMPARISON: None.   ACCESSION NUMBER(S): [id number]   ORDERING CLINICIAN: ISRAEL RAMOS   FINDINGS: There is magnification of the cardiac silhouette secondary AP technique. TAVR. Asymmetric density over right thorax appears to correspond to breast prosthesis. No focal airspace consolidation or pleural effusion. No pneumothorax. Right axillary surgical clips.       No focal airspace consolidative opacity.   MACRO: None   Signed by: Mert Batres 1/19/2025 11:29 PM Dictation workstation:   [id number]    CT head wo IV contrast    Result Date: 1/19/2025  Interpreted By:  Mert Batres, STUDY: CT HEAD WO IV CONTRAST;  1/19/2025 11:00 pm   INDICATION: Signs/Symptoms:worsening confusion.   COMPARISON: None   ACCESSION NUMBER(S): [id number]   ORDERING CLINICIAN: ISRAEL RAMOS   TECHNIQUE: Contiguous axial images of the head were obtained without intravenous contrast.   FINDINGS: BRAIN PARENCHYMA:  There is cerebral atrophy and chronic periventricular white matter small vessel ischemic change. The gray white matter differentiation is preserved.  No mass effect or midline shift.   HEMORRHAGE:  No evidence of acute intracranial hemorrhage. VENTRICLES AND EXTRA-AXIAL SPACES:  The ventricles are within normal limits in size for brain volume.  No evidence of abnormal extraaxial fluid  collection. EXTRACRANIAL SOFT TISSUES:  Within normal limits. PARANASAL SINUSES/MASTOIDS:  The visualized paranasal sinuses and mastoid air cells are clear and well pneumatized. CALVARIUM:  No evidence of depressed calvarial fracture.   OTHER FINDINGS:  None       Cerebral atrophy and chronic periventricular white matter small vessel ischemic change.   No evidence of acute intracranial hemorrhage.   MACRO: None   Signed by: Mert Batres 1/19/2025 11:28 PM Dictation workstation:   [id number]    MR 3D post processing w report 2nd workstation    Result Date: 1/17/2025  * * *Final Report* * * DATE OF EXAM: Jan 17 2025  4:10PM   [facility]   [id number]  -  MRI 3D BRAIN QUANT  / ACCESSION #  [id number] PROCEDURE REASON: Cognitive impairment, mild, so stated      * * * * Physician Interpretation * * * *  EXAMINATION: MRI BRAIN W QUANT WO IVCON, MRI 3D BRAIN QUANT CLINICAL HISTORY: Cognitive impairment, mild, so stated TECHNIQUE: Axial PAULINE FLAIR, PAULINE T2, diffusion and susceptibility weighted imaging without contrast, using the ADNI dementia protocol and 3-D post-processing using the [employer] software at an independent workstation with concurrent physician supervision and images were created, reviewed and archived.  MQ:  [id number] COMPARISON: Head CT 08/30/2012 RESULT: QUALITATIVE: Acute Intracranial Process: None. Chronic Intracranial Process: Scattered patchy and confluent areas of increased T2 and FLAIR signal are present in the supratentorial white matter which is nonspecific but likely represents chronic microvascular ischemia. Number of chronic lacunar infarcts: Less than or equal to 2 Location of chronic lacunar infarcts: Right cerebellar hemisphere. Age related white matter changes ([facility]) rating: White matter lesions: 2 Basal ganglia lesions: 2 Prior intracranial hemorrhage:      Parenchymal microhemorrhages: 0      Other (siderosis/macrohemorrhages (>10mm): Not Applicable Amyloid Related Imaging Abnormalities:      ARIA-E:  N/A      ARIA-H Microhemorrhage: N/A      ARIA-H Siderosis: N/A Qualitative brain and hippocampal volume loss for age:      Cortex: Moderate and symmetric      White Matter: Moderate and symmetric      Hippocampi: Moderate  and Symmetric Ventricles: Commensurate with volume loss. Brain Parenchymal Signal and Morphology: The brain parenchyma is otherwise within normal limits of signal and morphology.  There is no evidence of an intracranial mass or extraaxial fluid collection. Other Significant Findings: None. QUANTITATIVE: Exam Quality: Good for volumetric analysis. Segmentation: Negligible mismapping by visual inspection. Quantitative Data: Total Hippocampal Volume:   Percentile for Age: 1       Asymmetry Index: 20.6 Inferior Lateral Vent Volume:   Percentile for age: 99       Asymmetry Index: -36.8 Superior Lateral Vent Volume:   Percentile for age: 77       Asymmetry Index: 7.67 Temporal Lobe Cortex Volume:   Temporal Lobe Percentile for Age: 1       Temporal Lobe Asymmetry Index: 21.4  Frontal Lobe Cortex Volume:   Frontal Lobe Percentile for Age: 35       Frontal Lobe Asymmetry Index: 0.82  Parietal Lobe Cortex Volume:   Parietal Lobe Percentile for Age:1  Occipital Lobe Cortex Volume:   Occipital Lobe Percentile for Age: 84  Whole Brain Volume       Brain Percentile for Age: 43 Concordance between qualitative and quantitative hippocampal volume assessment: Concordant Change in brain volumes: No previous volumetric study for comparison See below for comparison of brain volumes in relation to the prior volumetric study:  Not applicable. The prior study was reprocessed with the current algorithm version for adequate comparison.  Please note that small variations may be due to standard measurement error.      Brain Volume:           Current percentile: N/A           Previous percentile: N/A      Hippocampal Volume:           Current percentile: N/A           Previous percentile: N/A      Superior Lateral Ventricle  Volume Change:           Current percentile: N/A           Previous percentile: N/A      Inferior Lateral Ventricle Volume Change:           Current percentile: N/A           Previous percentile: N/A     Mean hippocampal volume loss among normal elderly: 0.7% per year, (-0.3 to 1.7;  Emani 2008; also Gordon 2010).    IMPRESSION: *  No evidence of an acute intracranial process or intracranial mass. *  Moderate generalized volume loss. *  Hippocampal volumes at the first percentile when compared to age matched normal controls by quantitative analysis. *  Moderate white matter disease which is nonspecific but likely reflective of chronic microvascular ischemia. *  No evidence of parenchymal microhemorrhages by MRI. REFERENCES: White Matter Lesions:      0 = No lesions, including symmetrical, well-defined caps or bands      1 = Focal Lesions      2 = Beginning of [place]      3 = Diffuse Involvement of Entire Region Basal Ganglia Lesions:      0 = No Lesions      1 = 1 Focal Lesion (>5mm)      2 = >1 Focal Lesion (>5mm)      3 = Confluent Lesions Gordon CERVANTES, et al. The clinical use of structural MRI in Alzheimer disease. Nature Reviews Neurology 6;67 (2010). Emani et al. Validation of a fully automated 3D hippocampal segmentation method using subjects with Alzheimer's disease mild cognitive impairment, and elderly controls. Neuroimage 43;59 (2008). Wahlund et al. A New Rating Scale for Age-Related White Matter Changes Applicable to MRI and CT. Stroke. 32:1318 (2001). *  Asymmetry index defined as difference between left and right volumes divided by mean or [(L-R/Mean) x 100] (%). ** Age-matched reference charts measure total hippocampal volume (% of intracranial volume). See results from the analysis charts for details. : JOSÉ MANUEL   Transcribe Date/Time: Jan 17 2025  5:13P Dictated by : ROSY COLBERT, DO This examination was interpreted and the report reviewed and electronically signed by: ROSY  DO SAYRA on Jan 17 2025  5:35PM  EST    MR brain wo IV contrast    Result Date: 1/17/2025  * * *Final Report* * * DATE OF EXAM: Jan 17 2025  4:10PM   [id number]  -  MRI BRAIN W QUANT WO IVCON  / ACCESSION #  [id number] PROCEDURE REASON: Cognitive impairment, mild, so stated      * * * * Physician Interpretation * * * *  EXAMINATION: MRI BRAIN W QUANT WO IVCON, MRI 3D BRAIN QUANT CLINICAL HISTORY: Cognitive impairment, mild, so stated TECHNIQUE: Axial PAULINE FLAIR, PAULINE T2, diffusion and susceptibility weighted imaging without contrast, using the ADNI dementia protocol and 3-D post-processing using the [employer] software at an independent workstation with concurrent physician supervision and images were created, reviewed and archived.  MQ:  [id number] COMPARISON: Head CT 08/30/2012 RESULT: QUALITATIVE:   Acute Intracranial Process: None. Chronic Intracranial Process: Scattered patchy and confluent areas of increased T2 and FLAIR signal are present in the supratentorial white matter which is nonspecific but likely represents chronic microvascular ischemia. Number of chronic lacunar infarcts: Less than or equal to 2 Location of chronic lacunar infarcts: Right cerebellar hemisphere. Age related white matter changes ([facility]) rating: White matter lesions: 2 Basal ganglia lesions: 2 Prior intracranial hemorrhage:      Parenchymal microhemorrhages: 0      Other (siderosis/macrohemorrhages (>10mm): Not Applicable Amyloid Related Imaging Abnormalities:      ARIA-E: N/A      ARIA-H Microhemorrhage: N/A      ARIA-H Siderosis: N/A Qualitative brain and hippocampal volume loss for age:      Cortex: Moderate and symmetric      White Matter: Moderate and symmetric      Hippocampi: Moderate  and Symmetric Ventricles: Commensurate with volume loss. Brain Parenchymal Signal and Morphology: The brain parenchyma is otherwise within normal limits of signal and morphology.  There is no evidence of an intracranial mass or extraaxial fluid  collection. Other Significant Findings: None. QUANTITATIVE: Exam Quality: Good for volumetric analysis. Segmentation: Negligible mismapping by visual inspection. Quantitative Data: Total Hippocampal Volume:   Percentile for Age: 1       Asymmetry Index: 20.6 Inferior Lateral Vent Volume:   Percentile for age: 99       Asymmetry Index: -36.8 Superior Lateral Vent Volume:   Percentile for age: 77       Asymmetry Index: 7.67 Temporal Lobe Cortex Volume:   Temporal Lobe Percentile for Age: 1       Temporal Lobe Asymmetry Index: 21.4  Frontal Lobe Cortex Volume:   Frontal Lobe Percentile for Age: 35       Frontal Lobe Asymmetry Index: 0.82  Parietal Lobe Cortex Volume:   Parietal Lobe Percentile for Age:1  Occipital Lobe Cortex Volume:   Occipital Lobe Percentile for Age: 84  Whole Brain Volume       Brain Percentile for Age: 43 Concordance between qualitative and quantitative hippocampal volume assessment: Concordant Change in brain volumes: No previous volumetric study for comparison See below for comparison of brain volumes in relation to the prior volumetric study:  Not applicable. The prior study was reprocessed with the current algorithm version for adequate comparison.  Please note that small variations may be due to standard measurement error.      Brain Volume:           Current percentile: N/A           Previous percentile: N/A      Hippocampal Volume:           Current percentile: N/A           Previous percentile: N/A      Superior Lateral Ventricle Volume Change:           Current percentile: N/A           Previous percentile: N/A      Inferior Lateral Ventricle Volume Change:           Current percentile: N/A           Previous percentile: N/A     Mean hippocampal volume loss among normal elderly: 0.7% per year, (-0.3 to 1.7;  Emani 2008; also Gordon 2010).    IMPRESSION: *  No evidence of an acute intracranial process or intracranial mass. *  Moderate generalized volume loss. *  Hippocampal volumes at  the first percentile when compared to age matched normal controls by quantitative analysis. *  Moderate white matter disease which is nonspecific but likely reflective of chronic microvascular ischemia. *  No evidence of parenchymal microhemorrhages by MRI. REFERENCES: White Matter Lesions:      0 = No lesions, including symmetrical, well-defined caps or bands      1 = Focal Lesions      2 = Beginning of [place]      3 = Diffuse Involvement of Entire Region Basal Ganglia Lesions:      0 = No Lesions      1 = 1 Focal Lesion (>5mm)      2 = >1 Focal Lesion (>5mm)      3 = Confluent Lesions Gordon CERVANTES et al. The clinical use of structural MRI in Alzheimer disease. Nature Reviews Neurology 6;67 (2010). Emani et al. Validation of a fully automated 3D hippocampal segmentation method using subjects with Alzheimer's disease mild cognitive impairment, and elderly controls. Neuroimage 43;59 (2008). Martha et al. A New Rating Scale for Age-Related White Matter Changes Applicable to MRI and CT. Stroke. 32:1318 (2001). *  Asymmetry index defined as difference between left and right volumes divided by mean or [(L-R/Mean) x 100] (%). ** Age-matched reference charts measure total hippocampal volume (% of intracranial volume). See results from the analysis charts for details. : JOSÉ MANUEL   Transcribe Date/Time: Jan 17 2025  5:13P Dictated by : ROSY COLBERT DO This examination was interpreted and the report reviewed and electronically signed by: ROSY COLBERT DO on Jan 17 2025  5:35PM  EST         Assessment/Plan   Assessment & Plan  Delirium    Confusion and disorientation    Wandering behavior    Elevated troponin      Alzheimer's dementia with behavioral disturbances  Psychosis NOS    Discussed with family including her  and son Dallin at length about the treatment options that are available.  I discussed antipsychotic as one of the option and the other option would be Depakote to decrease her impulsive  confusion and irritability.  Discussed with the family risk benefits and alternatives .   patient lacked capacity to make decision.   Dallin is unable to make any decision regarding the medication options at this time.  Asked him to think about it and let the team know about the outcome so that they can start her on Depakote 125 mg twice daily  Family wants patient to be placed in the memory care unit on discharge from the hospital which I agreed to  Please call me if needed for any further help with the management       Bryan Meier MD

## 2025-01-21 NOTE — PROGRESS NOTES
Referral was sent to the [facility] from the ED yesterday then patient was admitted. [facility] said they will have a bed and can accept 1/23.

## 2025-01-21 NOTE — CONSULTS
"CARDIOLOGY SERVICE - Initial CONSULT    CVM Dr. Wesly Arango  [facility]    PATIENT NAME: Isela Mcfadden  PATIENT MRN: [id number]    SERVICE DATE:  1/21/2025  SERVICE TIME: 7:48 AM    CONSULTANT: Wesly Arango MD [facility]  PRIMARY CARE PHYSICIAN: No primary care provider on file.  ATTENDING PHYSICIAN: Darrell Miller MD    IMPRESSIONS  Admitted with mental status changes  Elevated HST 74, 91, 70 history of negative cath 2/15/2024  TAVR [facility] 7/3/2024, [PG 18/10 echo 8/2024]  Preserved EF 57  Hyperlipidemia LDL 87 in 2024, on Crestor  ILD anemia~ 8  Uterine CA 1998, breast CA 2001, dementia  Card meds PTA aspirin, Crestor    RECOMMENDATIONS  I do not see evidence of ACS  Repeat EKG  Anemia management  Consider echo  Telemetry  We will follow    Thank you for this consultation.  =============================================================    REASON FOR CONSULTATION: Elevated troponin  Chief Complaint   Patient presents with    Altered Mental Status     Pt from home was found wandering around outside. Pt currently on Macrobid for treatment of UTI. Pt has been having hallucinations per ems. Pt has hx of mild dementia. Pt's family called a hotline and was recommended to take her to the ER     /62 (BP Location: Left arm, Patient Position: Lying)   Pulse 76   Temp 36.2 °C (97.2 °F) (Temporal)   Resp 17   Ht 1.6 m (5' 2.99\")   Wt 61.6 kg (135 lb 12.9 oz)   SpO2 98%   BMI 24.06 kg/m²   MEDICATIONS:  No current facility-administered medications on file prior to encounter.     Current Outpatient Medications on File Prior to Encounter   Medication Sig Dispense Refill    aspirin 81 mg EC tablet Take 1 tablet (81 mg) by mouth once daily.      nitrofurantoin, macrocrystal-monohydrate, (Macrobid) 100 mg capsule Take 1 capsule (100 mg) by mouth 2 times a day. Started 1/14/25 x 7 days      rosuvastatin (Crestor) 5 mg tablet Take 1 tablet (5 mg) by mouth 2 times a week. Sunday & Thursday      cholecalciferol (Vitamin D-3) " "25 MCG (1000 UT) capsule Take 1 capsule (25 mcg) by mouth once daily.      donepezil (Aricept) 5 mg tablet Take 1 tablet (5 mg) by mouth once daily at bedtime.      multivitamin tablet Take 1 tablet by mouth once daily.       HISTORY: Isela Mcfadden is a 79 y.o. female in with mental status changes.  Was wandering around totally confused according to records.  No distinct history of chest pain no acute EKG changes and mild elevation of troponin up to 90.    BNP   Date/Time Value Ref Range Status   01/19/2025 10:11  0 - 99 pg/mL Final     No results found for: \"CHOL\"  No results found for: \"HDL\"  No results found for: \"LDLCALC\"  No results found for: \"TRIG\"  No components found for: \"CHOLHDL\"  TROPHS   Date/Time Value Ref Range Status   01/20/2025 06:05 AM 70 0 - 13 ng/L Final     Comment:     Previous result verified on 1/19/2025 2258 on specimen/case [id number] called with component TRP for procedure Troponin I, High Sensitivity, Initial with value 74 ng/L.   01/20/2025 12:18 AM 91 0 - 13 ng/L Final     Comment:     Previous result verified on 1/19/2025 2258 on specimen/case [id number] called with component TRPHS for procedure Troponin I, High Sensitivity, Initial with value 74 ng/L.   01/19/2025 10:11 PM 74 0 - 13 ng/L Final       Echo  No results found for this or any previous visit.    Encounter Date: 01/19/25   ECG 12 lead   Result Value    Ventricular Rate 95    Atrial Rate 95    NE Interval 184    QRS Duration 90    QT Interval 364    QTC Calculation(Bazett) 457    P Axis 69    R Axis 10    T Axis 74    QRS Count 16    Q Onset 226    P Onset 134    P Offset 190    T Offset 408    QTC Fredericia 424    Narrative    Normal sinus rhythm  Possible Left atrial enlargement  Borderline ECG  When compared with ECG of 01-AUG-2002 07:05,  Nonspecific T wave abnormality no longer evident in Inferior leads     PAST MEDICAL HISTORY:    Past Medical History:   Diagnosis Date    Adrenal adenoma, left  "    Aortic stenosis     Chronic anemia     Cognitive communication deficit     Dementia     History of antineoplastic chemotherapy     History of breast cancer     History of COVID-19     History of uterine cancer     Hyperlipidemia     Hypertension     Intention tremor     Interstitial lung disease (Multi)     Memory loss     Multiple pulmonary nodules     Osteopenia      PAST SURGICAL HISTORY:  Past Surgical History:   Procedure Laterality Date    AORTIC VALVE REPLACEMENT      APPENDECTOMY      BREAST RECONSTRUCTION      COLONOSCOPY W/ POLYPECTOMY      DILATION AND CURETTAGE OF UTERUS      MASTECTOMY      TOTAL ABDOMINAL HYSTERECTOMY W/ BILATERAL SALPINGOOPHORECTOMY       ALLERGIES AND DRUG INTOLERANCES:   Allergies   Allergen Reactions    Penicillins Itching and Rash    Erythromycin Swelling and Rash     FAMILY HISTORY:    Family History   Problem Relation Name Age of Onset    Pancreatic cancer Mother      Esophageal cancer Father      Breast cancer Sister      Heart failure Brother       SOCIAL HISTORY:    Social History     Socioeconomic History    Marital status:      Spouse name: Not on file    Number of children: Not on file    Years of education: Not on file    Highest education level: Not on file   Occupational History    Not on file   Tobacco Use    Smoking status: Former     Types: Cigarettes    Smokeless tobacco: Never   Substance and Sexual Activity    Alcohol use: Never    Drug use: Never    Sexual activity: Not on file   Other Topics Concern    Not on file   Social History Narrative    Not on file     Social Drivers of Health     Financial Resource Strain: Low Risk  (1/20/2025)    Overall Financial Resource Strain (CARDIA)     Difficulty of Paying Living Expenses: Not very hard   Food Insecurity: Patient Unable To Answer (1/20/2025)    Hunger Vital Sign     Worried About Running Out of Food in the Last Year: Patient unable to answer     Ran Out of Food in the Last Year: Patient unable to answer    Transportation Needs: Patient Unable To Answer (1/20/2025)    PRAPARE - Transportation     Lack of Transportation (Medical): Patient unable to answer     Lack of Transportation (Non-Medical): Patient unable to answer   Physical Activity: Inactive (1/20/2025)    Exercise Vital Sign     Days of Exercise per Week: 0 days     Minutes of Exercise per Session: 0 min   Stress: No Stress Concern Present (1/20/2025)    [place] [place] [employer] - Occupational Stress Questionnaire     Feeling of Stress : Not at all   Social Connections: Moderately Integrated (1/20/2025)    Social Connection and Isolation Panel [NHANES]     Frequency of Communication with Friends and Family: More than three times a week     Frequency of Social Gatherings with Friends and Family: More than three times a week     Attends Muslim Services: 1 to 4 times per year     Active Member of Clubs or Organizations: No     Attends Club or Organization Meetings: Never     Marital Status:    Intimate Partner Violence: Not on file   Housing Stability: Patient Unable To Answer (1/20/2025)    Housing Stability Vital Sign     Unable to Pay for Housing in the Last Year: Patient unable to answer     Number of Times Moved in the Last Year: 1     Homeless in the Last Year: Patient unable to answer     COMPLETE REVIEW OF SYSTEMS:  12 systems were reviewed with the patient heart and a chart     PHYSICAL EXAM:  Not in distress  Awake alert  Heart RRR  Lungs decreased BSs  Extremities no edema    LABS:  Lab Results   Component Value Date    GLUCOSE 86 01/21/2025    CALCIUM 8.9 01/21/2025     01/21/2025    K 3.9 01/21/2025    CO2 27 01/21/2025     01/21/2025    BUN 10 01/21/2025    CREATININE 0.72 01/21/2025      Lab Results   Component Value Date    WBC 4.9 01/21/2025    HGB 7.9 (L) 01/21/2025    HCT 25.5 (L) 01/21/2025    MCV 97 01/21/2025     (L) 01/21/2025        This clinical note has been produced using speech recognition  software and may contain errors related to that system including grammar, punctuation, spelling, gender and words and phrases that may be inappropriate. Some errors were not noted in checking the note before saving.     SIGNATURE: Wesly Arango MD [facility]  OFFICE: [phone]

## 2025-01-21 NOTE — CARE PLAN
The patient's goals for the shift include get some sleep    The clinical goals for the shift include THE PT WILL BE HDS AND WILL REMAIN IN A SAFE ENVIRONMENT DEMONSTRATING APPROPRIATE COPING SKILLS AND COMMUNICATION MEASURES BY THE END OF THIS SHIFT.

## 2025-01-21 NOTE — PROGRESS NOTES
Occupational Therapy    Evaluation    Patient Name: Isela Mcfadden  MRN: [id number]  Department: [facility]  Room: [address] [place], [place] [address]  Today's Date: 1/21/2025  Time Calculation  Start Time: 1204  Stop Time: 1230  Time Calculation (min): 26 min    Assessment  IP OT Assessment  End of Session Communication: Bedside nurse  End of Session Patient Position: Bed, 3 rail up, Alarm on (spouse and sons present, all needs in reach)  Plan:  Treatment Interventions: ADL retraining, Functional transfer training, Endurance training, Cognitive reorientation, Patient/family training, Equipment evaluation/education, Neuromuscular reeducation, Compensatory technique education  OT Frequency: 3 times per week  OT Discharge Recommendations: Low intensity level of continued care, Moderate intensity level of continued care (24 hour sup/assist due to cognitive deficits)  OT - OK to Discharge: Yes (when medically appropriate)    Subjective   Current Problem:  1. Confusion and disorientation          General:  General  Reason for Referral: ADL  Referred By: [place]  Past Medical History Relevant to Rehab: Includes: Dyslipidemia, HTN, dementia, osteopenia, breast and uterine CA, L mastectomy, hysterectomy, aortic valve replacement 7/2024, pulmonary nodules, intention tremor.  Co-Treatment: PT  Co-Treatment Reason: safety  Prior to Session Communication: Bedside nurse  Patient Position Received: Bed, 3 rail up, Alarm on (son present)  General Comment: To ED 1/19 with AMS, found wandering outside.  Being treated for UTI.  Precautions:  Medical Precautions: Fall precautions    Vital Signs Comment: HR 88    Pain:  Pain Assessment  Pain Assessment: 0-10  0-10 (Numeric) Pain Score: 0 - No pain    Objective   Cognition:  Overall Cognitive Status: Impaired  Orientation Level: Disoriented to situation  Problem Solving: Exceptions to WFL  Complex Functional Tasks: Impaired  Simple Functional Tasks: Impaired  Verbal Reasoning Skills: Impaired  Abstract Reasoning:  Exceptions to WFL  Safety/Judgement: Exceptions to WFL  Novel Situations: Moderate  Routine Tasks: Moderate  Unable to Self-Monitor and Self-Correct Consistently: Moderate  Insight: Moderate  Planning: Reduced planning skills  Organization: Moderately disorganized  Processing Speed: Delayed           Home Living:  Home Living Comments: Lives with spouse.  Son lives nearby. No steps to enter front door. 1 step to enter through garage. 1st floor set up. 1st floor bed/bath/laundry. No AD use. Independent with ADLs. Pt. and spouse share IADLs. No falls.  Spouse does the driving. Walk in shower with seat and safety bar. full flight and handrail to 2nd floor.  Pt. occasionally goes to 2nd floor.      ADL:  Eating Deficit: Supervision/safety  Grooming Assistance: Minimal  Grooming Deficit: Verbal cueing, Supervision/safety, Steadying  Bathing Assistance: Minimal  Bathing Deficit: Verbal cueing, Supervision/safety, Steadying  UE Dressing Assistance: Minimal  UE Dressing Deficit: Steadying, Verbal cueing, Supervision/safety  LE Dressing Assistance: Minimal  LE Dressing Deficit: Steadying, Supervision/safety, Verbal cueing  Toileting Assistance with Device: Minimal  Toileting Deficit: Steadying, Verbal cueing, Supervison/safety  Functional Assistance: Minimal  Functional Deficit: Steadying, Verbal cueing, Supervision/safety     Bed Mobility/Transfers: Bed Mobility  Bed Mobility:  (Supervision for sup to sit)    Transfers  Transfer:  (Min assist sit<>stand at EOB and toilet.  Assist for lift, balance, descent, cues for safety and problem solving.)    Functional Mobility:  Functional Mobility  Functional Mobility Performed:  (Ambulated between ADL tasks in room, ~ 25' with R hand held assistance, min assist for balance/steadying/safety.)  Sitting Balance:  Static Sitting Balance  Static Sitting-Comment/Number of Minutes: Good  Dynamic Sitting Balance  Dynamic Sitting-Comments: Good (-)  Standing Balance:  Static Standing  Balance  Static Standing-Comment/Number of Minutes: Fair (+)  Dynamic Standing Balance  Dynamic Standing-Comments: Fair (-)    Strength:  Strength Comments: UEs WFL     Extremities: RUE   RUE :  (AROM WFL) and LUE   LUE:  (AROM WFL)    Outcome Measures: [facility] Daily Activity  Putting on and taking off regular lower body clothing: A little  Bathing (including washing, rinsing, drying): A little  Putting on and taking off regular upper body clothing: A little  Toileting, which includes using toilet, bedpan or urinal: A little  Taking care of personal grooming such as brushing teeth: A little  Eating Meals: A little  Daily Activity - Total Score: 18      Education Documentation  Body Mechanics, taught by Shonna Mercer OT at 1/21/2025  1:55 PM.  Learner: Patient  Readiness: Acceptance  Method: Explanation  Response: Needs Reinforcement    Precautions, taught by Shonna Mercer OT at 1/21/2025  1:55 PM.  Learner: Patient  Readiness: Acceptance  Method: Explanation  Response: Needs Reinforcement    ADL Training, taught by Shonna Mercer OT at 1/21/2025  1:55 PM.  Learner: Patient  Readiness: Acceptance  Method: Explanation  Response: Needs Reinforcement    Education Comments    Patient required cues and assist for safe transfers, aligning with seats, problem solving underwear management, hygiene and grooming.      Goals:   Encounter Problems       Encounter Problems (Active)       OT Goals       Distant sup ADL/IADL functional mobility with or without device.        Start:  01/21/25    Expected End:  02/04/25            Distant sup sit/stand, bed/chair/commode transfers with or without device.        Start:  01/21/25    Expected End:  02/04/25            Good dynamic standing balance for ADL/IADL.        Start:  01/21/25    Expected End:  02/04/25            Distant sup for toileting.        Start:  01/21/25    Expected End:  02/04/25            Distant sup for grooming.        Start:  01/21/25     Expected End:  02/04/25

## 2025-01-21 NOTE — CONSULTS
Consults    79-year-old lady.  History obtained from the patient as well as more importantly from the patient's son and .    Background history is dementia uterine cancer in the past total abdominal hysterectomy remote breast cancer treated with chemotherapy and mastectomy, aortic valve replacement July 2024  Chronic intermittent tremor  Adrenal adenoma, interstitial lung disease multiple pulmonary nodules osteopenia    No smoking alcohol or substance abuse  She used to work as a .    Came in for behavior change    She was evaluated by her geriatric specialist.  She was having visual hallucinations seeing and talking to people who were not there.  Worsening memory deficits.  Diagnosed with dementia about a year ago.  Family noticed it even before that she had word finding difficulty.  Losing train of thought.  Now no longer drives.  Does not manage the money.  No longer cooks at home.  Sometimes fails to recognize her .  More and more slowness of thinking and word finding difficulty.    Recently received antibiotics for suspected urinary tract infection.    What prompted this admission was that she was found wandering out of the home.  Wearing only socks on her feet.  Seemed confused.  She had no idea why she was going out of the home but then she was trying to indicate that she had some people threatening her at home and she felt there was a burglar at home.  It was her  was living at home with her.  She mentioned that people have been intruding into her home.    Examination:    Neurologic exam:  Mental status : speech, was okay.  Language was truncated.  She had word finding difficulty.  She did recognize her  and her son.  She struggled to tell me answers such as how long she had lived in the house.  She lives in [place].  She told me she has 3 children 2 sons and a daughter.  She could not tell me where exactly the children live.  Short-term memory was poor.  Judgment  and insight diminished.  Made good eye contact.  Cranial nerves: cranial nerves were examined.  Pupils were equal and reactive to light.  External ocular movements were normal.  Visual fields were normal.  Face was symmetric.  Tongue was in the midline.  Soft palate move normally.  Facial sensations were normal.  Hearing was normal.  Neck : supple.  Gait and Station : Not evaluated  Strength : normal.  Muscle tone : normal.  Abnormal involuntary movements : Slight tremor of the outstretched hands.  No head tremor or voice tremor  Atrophy/fasciculations : none.  Muscle stretch reflexes : normal.  In the upper limbs, difficult to obtain the lower limbs  Pathologic reflexes : absent.  Sensory : normal.  To pinprick  Coordination : normal.  Cardiovascular : normal first and second heart sounds.  No murmur.  No cranial or cervical bruits.    Chest : chest expansion was normal.  Breath sounds normal.  No adventitious sounds.    CT brain did not show any significant abnormality.  I independently reviewed the images.  Some atrophy.  And nonspecific white matter changes.      MRI brain showed some nonspecific white matter changes.-The MRI brain was done at the [facility].    Apparently has had workup for memory difficulty in the past.  Recently started on donepezil January 15, 2025.    Vital signs are stable.  WBC 6K per microliter hemoglobin 9.1 which was low.  Magnesium 1.9 glucose 87 electrolytes normal BUN 20 creatinine 0.87 ammonia 20  TSH 2.2 coronavirus test negative influenza test negative urine WBC 1-5 per high-power field    Impression discussion plan    Acute encephalopathy superimposed on chronic encephalopathy.-Chronic neurocognitive disorder-primary degenerative dementia  Alteration in behavior.  Confusion, disorientation.  Had extensive workup at the [facility] according to the family.  New problems are monitoring, auditory and visual hallucinations.  No definite features of extrapyramidal  disorder such as parkinsonian syndrome.  Behavior change recently could be due to worsening of neurocognitive disorder, now unfamiliar surroundings.  No definite evidence of infarct or intracranial bleeding subdural hematoma or hydrocephalus.  No definite features of systemic infection or inflammation.  No head injury.    She does have mild tremor of the outstretched hands-essential tremor.  Apparently some family member has this too.  She told me this has been going on for several years.  No significant worsening.    No clear indication for medication for tremor management at this time because it is not interfering with her life significantly.    Reinstate donepezil 5 mg daily.  This can be increased steadily to a target of 10 mg daily when she follows up with her geriatric specialist to actually initiated the medication.  Indications and risks discussed with the patient's family    She will need 24/7 supervision.  Memory care unit may be reasonable if she is not able to be managed at home.    Differential diagnosis pathogenesis prognosis discussed in detail with the patient's family.    Appreciate input from psychiatrist-I reviewed the notes  Depakote was suggested for impulse control and behavior management and family undecided about the medication.      I discussed with the attending physician

## 2025-01-21 NOTE — CARE PLAN
Problem: Fall/Injury  Goal: Not fall by end of shift  Outcome: Progressing  Goal: Be free from injury by end of the shift  Outcome: Progressing  Goal: Verbalize understanding of personal risk factors for fall in the hospital  Outcome: Progressing     Problem: Infection prevention/bleeding  Goal: Infection s/sx managed  Outcome: Progressing  Goal: No further progression of infection  Outcome: Progressing     Problem: Perfusion/Cardiac  Goal: Adequate perfusion to organs/extremities  Outcome: Progressing  Goal: Hemodynamically stable  Outcome: Progressing  Goal: No cardiac arrhythmias  Outcome: Progressing     Problem: Respiratory/Oxygenation  Goal: No signs of respiratory compromise  Outcome: Progressing     Problem: Neuro/Coping  Goal: Increase self care/family involvement  Outcome: Progressing     Problem: Fluid/Electrolyte/Nutrition  Goal: Normal electrolyte levels  Outcome: Progressing  Goal: Normal glucose levels  Outcome: Progressing  Goal: Tolerates nutritional intake  Outcome: Progressing     Problem: Mobility  Goal: Maintains or increases mobility and physical activity  Outcome: Progressing     Problem: Family/Caregiver Engagement  Goal: Family members and/or caregivers are engaged in care delivery process  Outcome: Progressing     Problem: Agitation  Goal: Patient experiences decreased agitation  Outcome: Progressing     Problem: Hydration  Goal: Patient maintains adequate hydration  Outcome: Progressing   The patient's goals for the shift include get some sleep    The clinical goals for the shift include to maintain the pts safety    Over the shift, the patient did not make progress toward the following goals. Barriers to progression include confusion. Recommendations to address these barriers include monitor, meds, involve family.

## 2025-01-22 ENCOUNTER — APPOINTMENT (OUTPATIENT)
Dept: CARDIOLOGY | Facility: HOSPITAL | Age: 80
End: 2025-01-22
Payer: MEDICARE

## 2025-01-22 LAB
ALBUMIN SERPL BCP-MCNC: 3.4 G/DL (ref 3.4–5)
ALP SERPL-CCNC: 64 U/L (ref 33–136)
ALT SERPL W P-5'-P-CCNC: 25 U/L (ref 7–45)
ANION GAP SERPL CALC-SCNC: 12 MMOL/L (ref 10–20)
APPEARANCE UR: CLEAR
AST SERPL W P-5'-P-CCNC: 38 U/L (ref 9–39)
BILIRUB SERPL-MCNC: 0.7 MG/DL (ref 0–1.2)
BILIRUB UR STRIP.AUTO-MCNC: NEGATIVE MG/DL
BUN SERPL-MCNC: 15 MG/DL (ref 6–23)
CALCIUM SERPL-MCNC: 8.8 MG/DL (ref 8.6–10.3)
CHLORIDE SERPL-SCNC: 105 MMOL/L (ref 98–107)
CO2 SERPL-SCNC: 28 MMOL/L (ref 21–32)
COLOR UR: YELLOW
CREAT SERPL-MCNC: 0.89 MG/DL (ref 0.5–1.05)
EGFRCR SERPLBLD CKD-EPI 2021: 65 ML/MIN/1.73M*2
ERYTHROCYTE [DISTWIDTH] IN BLOOD BY AUTOMATED COUNT: 15.2 % (ref 11.5–14.5)
GLUCOSE SERPL-MCNC: 96 MG/DL (ref 74–99)
GLUCOSE UR STRIP.AUTO-MCNC: NORMAL MG/DL
HCT VFR BLD AUTO: 26.9 % (ref 36–46)
HGB BLD-MCNC: 8.3 G/DL (ref 12–16)
HOLD SPECIMEN: NORMAL
KETONES UR STRIP.AUTO-MCNC: NEGATIVE MG/DL
LEUKOCYTE ESTERASE UR QL STRIP.AUTO: NEGATIVE
MCH RBC QN AUTO: 30.4 PG (ref 26–34)
MCHC RBC AUTO-ENTMCNC: 30.9 G/DL (ref 32–36)
MCV RBC AUTO: 99 FL (ref 80–100)
NITRITE UR QL STRIP.AUTO: NEGATIVE
NRBC BLD-RTO: 0 /100 WBCS (ref 0–0)
PH UR STRIP.AUTO: 7 [PH]
PLATELET # BLD AUTO: 110 X10*3/UL (ref 150–450)
POTASSIUM SERPL-SCNC: 4.7 MMOL/L (ref 3.5–5.3)
PROT SERPL-MCNC: 5.4 G/DL (ref 6.4–8.2)
PROT UR STRIP.AUTO-MCNC: NEGATIVE MG/DL
RBC # BLD AUTO: 2.73 X10*6/UL (ref 4–5.2)
RBC # UR STRIP.AUTO: NEGATIVE /UL
SODIUM SERPL-SCNC: 140 MMOL/L (ref 136–145)
SP GR UR STRIP.AUTO: 1.02
UROBILINOGEN UR STRIP.AUTO-MCNC: ABNORMAL MG/DL
WBC # BLD AUTO: 5.7 X10*3/UL (ref 4.4–11.3)

## 2025-01-22 PROCEDURE — 81003 URINALYSIS AUTO W/O SCOPE: CPT | Performed by: NURSE PRACTITIONER

## 2025-01-22 PROCEDURE — 93306 TTE W/DOPPLER COMPLETE: CPT

## 2025-01-22 PROCEDURE — 2500000001 HC RX 250 WO HCPCS SELF ADMINISTERED DRUGS (ALT 637 FOR MEDICARE OP): Performed by: NURSE PRACTITIONER

## 2025-01-22 PROCEDURE — 1200000002 HC GENERAL ROOM WITH TELEMETRY DAILY

## 2025-01-22 PROCEDURE — 36415 COLL VENOUS BLD VENIPUNCTURE: CPT | Performed by: INTERNAL MEDICINE

## 2025-01-22 PROCEDURE — 85027 COMPLETE CBC AUTOMATED: CPT | Performed by: NURSE PRACTITIONER

## 2025-01-22 PROCEDURE — 2500000004 HC RX 250 GENERAL PHARMACY W/ HCPCS (ALT 636 FOR OP/ED): Performed by: NURSE PRACTITIONER

## 2025-01-22 PROCEDURE — 80053 COMPREHEN METABOLIC PANEL: CPT | Performed by: INTERNAL MEDICINE

## 2025-01-22 RX ORDER — DIVALPROEX SODIUM 125 MG/1
125 TABLET, DELAYED RELEASE ORAL EVERY 12 HOURS SCHEDULED
Status: DISCONTINUED | OUTPATIENT
Start: 2025-01-22 | End: 2025-01-24 | Stop reason: HOSPADM

## 2025-01-22 RX ADMIN — Medication 10 ML: at 05:46

## 2025-01-22 RX ADMIN — Medication 10 ML: at 17:47

## 2025-01-22 RX ADMIN — DONEPEZIL HYDROCHLORIDE 5 MG: 5 TABLET ORAL at 22:14

## 2025-01-22 RX ADMIN — Medication 10 ML: at 23:48

## 2025-01-22 RX ADMIN — FERROUS SULFATE TAB 325 MG (65 MG ELEMENTAL FE) 325 MG: 325 (65 FE) TAB at 08:57

## 2025-01-22 RX ADMIN — AMLODIPINE BESYLATE 5 MG: 5 TABLET ORAL at 08:57

## 2025-01-22 RX ADMIN — DIVALPROEX SODIUM 125 MG: 125 TABLET, DELAYED RELEASE ORAL at 18:25

## 2025-01-22 RX ADMIN — Medication 1000 UNITS: at 08:57

## 2025-01-22 RX ADMIN — ASPIRIN 81 MG: 81 TABLET, COATED ORAL at 08:57

## 2025-01-22 RX ADMIN — CEFTRIAXONE SODIUM 1 G: 1 INJECTION, SOLUTION INTRAVENOUS at 23:13

## 2025-01-22 ASSESSMENT — COGNITIVE AND FUNCTIONAL STATUS - GENERAL
MOBILITY SCORE: 17
DRESSING REGULAR LOWER BODY CLOTHING: A LITTLE
DRESSING REGULAR UPPER BODY CLOTHING: A LITTLE
MOVING FROM LYING ON BACK TO SITTING ON SIDE OF FLAT BED WITH BEDRAILS: A LITTLE
EATING MEALS: A LITTLE
TOILETING: A LITTLE
CLIMB 3 TO 5 STEPS WITH RAILING: A LOT
WALKING IN HOSPITAL ROOM: A LITTLE
HELP NEEDED FOR BATHING: A LITTLE
DAILY ACTIVITIY SCORE: 18
TURNING FROM BACK TO SIDE WHILE IN FLAT BAD: A LITTLE
STANDING UP FROM CHAIR USING ARMS: A LITTLE
PERSONAL GROOMING: A LITTLE
MOVING TO AND FROM BED TO CHAIR: A LITTLE

## 2025-01-22 ASSESSMENT — PAIN - FUNCTIONAL ASSESSMENT
PAIN_FUNCTIONAL_ASSESSMENT: 0-10

## 2025-01-22 ASSESSMENT — PAIN SCALES - GENERAL
PAINLEVEL_OUTOF10: 0 - NO PAIN

## 2025-01-22 NOTE — PROGRESS NOTES
"CARDIOLOGY SERVICE   CONSULT Progress note   CVM Dr. Wesly Arango  [facility]    PATIENT NAME: Isela Mcfadden  PATIENT MRN: [id number]  SERVICE DATE:  1/22/2025  SERVICE TIME: 9:57 AM    CONSULTANT: Wesly Arango MD  PRIMARY CARE PHYSICIAN: No primary care provider on file.  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, [PG 18/10 echo 8/2024]  Preserved EF 57  Hyperlipidemia LDL 87 in 2024, on Crestor  ILD anemia~ 8  Uterine CA 1998, breast CA 2001, dementia  Card meds PTA aspirin, Crestor    RECOMMENDATIONS:  ECHO    /56 (BP Location: Left arm, Patient Position: Lying)   Pulse 82   Temp 36.6 °C (97.9 °F)   Resp 17   Ht 1.6 m (5' 2.99\")   Wt 62.4 kg (137 lb 9.1 oz)   SpO2 97%   BMI 24.38 kg/m²     ====================================================    SUBJECTIVE: Okay no chest pain    BNP   Date/Time Value Ref Range Status   01/19/2025 10:11  0 - 99 pg/mL Final     No results found for: \"CHOL\"  No results found for: \"HDL\"  No results found for: \"LDLCALC\"  No results found for: \"TRIG\"  No components found for: \"CHOLHDL\"  TROPHS   Date/Time Value Ref Range Status   01/20/2025 06:05 AM 70 0 - 13 ng/L Final     Comment:     Previous result verified on 1/19/2025 2258 on specimen/case [id number] called with component TRPHS for procedure Troponin I, High Sensitivity, Initial with value 74 ng/L.   01/20/2025 12:18 AM 91 0 - 13 ng/L Final     Comment:     Previous result verified on 1/19/2025 2258 on specimen/case [id number] called with component TRPHS for procedure Troponin I, High Sensitivity, Initial with value 74 ng/L.   01/19/2025 10:11 PM 74 0 - 13 ng/L Final       Echo  No results found for this or any previous visit.    Encounter Date: 01/19/25   Electrocardiogram, 12-lead PRN ACS symptoms   Result Value    Ventricular Rate 96    Atrial Rate 96    SD " Interval 194    QRS Duration 88    QT Interval 362    QTC Calculation(Bazett) 457    P Axis 81    R Axis 49    T Axis 85    QRS Count 16    Q Onset 226    P Onset 129    P Offset 186    T Offset 407    QTC Fredericia 423    Narrative    Normal sinus rhythm  Nonspecific T wave abnormality  Abnormal ECG  When compared with ECG of 19-JAN-2025 21:06, (unconfirmed)  No significant change was found     LABS:  Lab Results   Component Value Date    WBC 5.7 01/22/2025    HGB 8.3 (L) 01/22/2025    HCT 26.9 (L) 01/22/2025    MCV 99 01/22/2025     (L) 01/22/2025      Lab Results   Component Value Date    GLUCOSE 96 01/22/2025    CALCIUM 8.8 01/22/2025     01/22/2025    K 4.7 01/22/2025    CO2 28 01/22/2025     01/22/2025    BUN 15 01/22/2025    CREATININE 0.89 01/22/2025        CURRENT CARDIOVASCULAR MEDICATIONS:  Current Facility-Administered Medications   Medication Dose Route Frequency Provider Last Rate Last Admin    acetaminophen (Tylenol) tablet 650 mg  650 mg oral q4h PRN ERIK Lewis        Or    acetaminophen (Tylenol) oral liquid 650 mg  650 mg oral q4h PRN ERIK Lewis        Or    acetaminophen (Tylenol) suppository 650 mg  650 mg rectal q4h PRN ERIK Lewis        amLODIPine (Norvasc) tablet 5 mg  5 mg oral Daily ERIK Zavala   5 mg at 01/22/25 0857    aspirin EC tablet 81 mg  81 mg oral Daily ERIK Zavala   81 mg at 01/22/25 0857    cefTRIAXone (Rocephin) 1 g in dextrose (iso) IV 50 mL  1 g intravenous q24h ERIK Zavala   Stopped at 01/22/25 0048    cholecalciferol (Vitamin D-3) tablet 1,000 Units  1,000 Units oral Daily ERIK Zavala   1,000 Units at 01/22/25 0857    donepezil (Aricept) tablet 5 mg  5 mg oral Nightly ERIK Zavala   5 mg at 01/21/25 2109    ferrous sulfate (325 mg ferrous sulfate) tablet 325 mg  65 mg of iron oral Daily with breakfast Svetlana Pickett, APRN-CNP    325 mg at 01/22/25 0857    melatonin tablet 3 mg  3 mg oral Nightly PRN Tessie Osborne APRN-CNP   3 mg at 01/21/25 2109    ondansetron ODT (Zofran-ODT) disintegrating tablet 4 mg  4 mg oral q8h PRN BRIAN Lewis-JOSE DAVID        Or    ondansetron (Zofran) injection 4 mg  4 mg intravenous q8h PRN Tessie Osborne APRN-CNP        polyethylene glycol (Glycolax, Miralax) packet 17 g  17 g oral Daily PRN BRIAN Lewis-CNP        [START ON 1/23/2025] rosuvastatin (Crestor) tablet 5 mg  5 mg oral Once per day on Sunday Thursday BRIAN Zavala-CNP        sodium chloride 0.9% flush 10 mL  10 mL intravenous q8h [facility] Tessie Osborne APRN-CNP   10 mL at 01/22/25 0546      PHYSICAL EXAM:  Awake Alert   Neck: No JVD  Cardiac:  S1 S2  Resp: Decreased BSs   Ext: No peripheral edema    This clinical note has been produced using speech recognition software and may contain errors related to that system including grammar, punctuation, spelling, gender and words and phrases that may be inappropriate. Some errors were not noted in checking the note before saving.     SIGNATURE: Wesly Arango MD MD [facility]  OFFICE: [phone]

## 2025-01-22 NOTE — PROGRESS NOTES
Isela Mcfadden is a 79 y.o. female on day 2 of admission presenting with Delirium.    Subjective   confused       Objective         Current Facility-Administered Medications:     acetaminophen (Tylenol) tablet 650 mg, 650 mg, oral, q4h PRN **OR** acetaminophen (Tylenol) oral liquid 650 mg, 650 mg, oral, q4h PRN **OR** acetaminophen (Tylenol) suppository 650 mg, 650 mg, rectal, q4h PRN, ERIK Lewis    amLODIPine (Norvasc) tablet 5 mg, 5 mg, oral, Daily, ERIK Zavala, 5 mg at 01/22/25 0857    aspirin EC tablet 81 mg, 81 mg, oral, Daily, ERIK Zavala, 81 mg at 01/22/25 0857    cefTRIAXone (Rocephin) 1 g in dextrose (iso) IV 50 mL, 1 g, intravenous, q24h, ERIK Zavala, Stopped at 01/22/25 0048    cholecalciferol (Vitamin D-3) tablet 1,000 Units, 1,000 Units, oral, Daily, ERIK Zavala, 1,000 Units at 01/22/25 0857    divalproex (Depakote) delayed release tablet 125 mg, 125 mg, oral, q12h KENDAL, ERIK Zavala, 125 mg at 01/22/25 1825    donepezil (Aricept) tablet 5 mg, 5 mg, oral, Nightly, ERIK Zavala, 5 mg at 01/21/25 2109    ferrous sulfate (325 mg ferrous sulfate) tablet 325 mg, 65 mg of iron, oral, Daily with breakfast, ERIK Zavala, 325 mg at 01/22/25 0857    melatonin tablet 3 mg, 3 mg, oral, Nightly PRN, ERIK Lewis, 3 mg at 01/21/25 2109    ondansetron ODT (Zofran-ODT) disintegrating tablet 4 mg, 4 mg, oral, q8h PRN **OR** ondansetron (Zofran) injection 4 mg, 4 mg, intravenous, q8h PRN, ERIK Lewis    polyethylene glycol (Glycolax, Miralax) packet 17 g, 17 g, oral, Daily PRN, ERIK Lewis    [START ON 1/23/2025] rosuvastatin (Crestor) tablet 5 mg, 5 mg, oral, Once per day on Sunday Thursday, ERIK Zavala    sodium chloride 0.9% flush 10 mL, 10 mL, intravenous, q8h KNEDAL, ERIK Lewis, 10 mL at 01/22/25 8523  "      Physical Exam  HENT:      Head: Normocephalic.      Mouth/Throat:      Pharynx: Oropharynx is clear.   Eyes:      Conjunctiva/sclera: Conjunctivae normal.   Cardiovascular:      Rate and Rhythm: Regular rhythm.   Pulmonary:      Breath sounds: Normal breath sounds.   Abdominal:      General: Bowel sounds are normal.      Palpations: Abdomen is soft.   Musculoskeletal:         General: Normal range of motion.   Skin:     General: Skin is warm and dry.   Neurological:      General: No focal deficit present.      Mental Status: She is alert.   Psychiatric:         Behavior: Behavior normal.           Last Recorded Vitals  Blood pressure 126/58, pulse 88, temperature 36.6 °C (97.9 °F), temperature source Temporal, resp. rate 18, height 1.6 m (5' 2.99\"), weight 62.4 kg (137 lb 9.1 oz), SpO2 96%.  Intake/Output last 3 Shifts:  I/O last 3 completed shifts:  In: 370 (5.9 mL/kg) [P.O.:270; IV Piggyback:100]  Out: 500 (8 mL/kg) [Urine:500 (0.2 mL/kg/hr)]  Weight: 62.4 kg     Labs:       Results for orders placed or performed during the hospital encounter of 01/19/25 (from the past 24 hours)   CBC   Result Value Ref Range    WBC 5.7 4.4 - 11.3 x10*3/uL    nRBC 0.0 0.0 - 0.0 /100 WBCs    RBC 2.73 (L) 4.00 - 5.20 x10*6/uL    Hemoglobin 8.3 (L) 12.0 - 16.0 g/dL    Hematocrit 26.9 (L) 36.0 - 46.0 %    MCV 99 80 - 100 fL    MCH 30.4 26.0 - 34.0 pg    MCHC 30.9 (L) 32.0 - 36.0 g/dL    RDW 15.2 (H) 11.5 - 14.5 %    Platelets 110 (L) 150 - 450 x10*3/uL   Comprehensive Metabolic Panel   Result Value Ref Range    Glucose 96 74 - 99 mg/dL    Sodium 140 136 - 145 mmol/L    Potassium 4.7 3.5 - 5.3 mmol/L    Chloride 105 98 - 107 mmol/L    Bicarbonate 28 21 - 32 mmol/L    Anion Gap 12 10 - 20 mmol/L    Urea Nitrogen 15 6 - 23 mg/dL    Creatinine 0.89 0.50 - 1.05 mg/dL    eGFR 65 >60 mL/min/1.73m*2    Calcium 8.8 8.6 - 10.3 mg/dL    Albumin 3.4 3.4 - 5.0 g/dL    Alkaline Phosphatase 64 33 - 136 U/L    Total Protein 5.4 (L) 6.4 - 8.2 " g/dL    AST 38 9 - 39 U/L    Bilirubin, Total 0.7 0.0 - 1.2 mg/dL    ALT 25 7 - 45 U/L   Urinalysis with Reflex Culture and Microscopic   Result Value Ref Range    Color, Urine Yellow Light-Yellow, Yellow, Dark-Yellow    Appearance, Urine Clear Clear    Specific Gravity, Urine 1.018 1.005 - 1.035    pH, Urine 7.0 5.0, 5.5, 6.0, 6.5, 7.0, 7.5, 8.0    Protein, Urine NEGATIVE NEGATIVE, 10 (TRACE), 20 (TRACE) mg/dL    Glucose, Urine Normal Normal mg/dL    Blood, Urine NEGATIVE NEGATIVE    Ketones, Urine NEGATIVE NEGATIVE mg/dL    Bilirubin, Urine NEGATIVE NEGATIVE    Urobilinogen, Urine 2 (1+) (A) Normal mg/dL    Nitrite, Urine NEGATIVE NEGATIVE    Leukocyte Esterase, Urine NEGATIVE NEGATIVE   Transthoracic Echo Complete   Result Value Ref Range    BSA 1.67 m2              Assessment/Plan   Assessment & Plan  Delirium     Confusion and disorientation    Wandering behavior     Elevated troponin     Recently dx UTI    Hx of TAVR        PLAN: Patient's family finally has agreed for trying Depakote, which was started earlier today, c/w IV CTX, med list and labs reviewed, patient is slightly more interacting and wants to go home as her  is alone at home, patient's family apparently wants her to go to [facility] in the memory unit due to worsening memory and dementia, for now c/w supportive care.              Darrell Miller MD

## 2025-01-22 NOTE — NURSING NOTE
1550: pt. Arrived to 3N from [facility]. Tele placed. Bedding changed.  and son at bedside. Depakote not given by [facility] RN prior to arrival to 3N. Dose requested from pharmacy at this time.    1653: pt. Ambulating in riddle with staff assistance at this time.    1733: Still awaiting Depakote dose from pharmacy at this time.     EOS: Pt. Stable after this RN assumed care at 1550. Pt. Tearful at times and confusion and anxious. Family remained at bedside. This RN sat at bedside and spoke with pt  and son multiple times at length to educate on plan of care within RN nursing scope of practice. Pt. Was able to ambulate to the bathroom and throughout the halls this shift with staff assistance. Pt. Remained on RA. No c/o pain. Pt. Started on Depakote this shift and first does given per MD orders. Pt. And family educated on start of medication and states they feel comfortable with her receiving medication. Dr. Miller at bedside this evening to discuss pt. Status and plan. Pt. Currently up in chair at this time. Call light within reach. chair alarm on.  and 2 sons at bedside.

## 2025-01-22 NOTE — PROGRESS NOTES
Isela Mcfadden is a 79 y.o. female on day 1 of admission presenting with Delirium.      Subjective   confused       Objective     Last Recorded Vitals  /78   Pulse 94   Temp 37 °C (98.6 °F)   Resp 25   Wt 61.6 kg (135 lb 12.9 oz)   SpO2 99%   Intake/Output last 3 Shifts:    Intake/Output Summary (Last 24 hours) at 1/21/2025 2226  Last data filed at 1/21/2025 0600  Gross per 24 hour   Intake 270 ml   Output 500 ml   Net -230 ml       Admission Weight  Weight: 72.6 kg (160 lb) (01/19/25 2204)    Daily Weight  01/20/25 : 61.6 kg (135 lb 12.9 oz)    Image Results  Electrocardiogram, 12-lead PRN ACS symptoms  Normal sinus rhythm  Nonspecific T wave abnormality  Abnormal ECG  When compared with ECG of 19-JAN-2025 21:06, (unconfirmed)  No significant change was found  ECG 12 Lead  Normal sinus rhythm  Nonspecific T wave abnormality  Abnormal ECG        Current Facility-Administered Medications:     acetaminophen (Tylenol) tablet 650 mg, 650 mg, oral, q4h PRN **OR** acetaminophen (Tylenol) oral liquid 650 mg, 650 mg, oral, q4h PRN **OR** acetaminophen (Tylenol) suppository 650 mg, 650 mg, rectal, q4h PRN, ERIK Lewis    amLODIPine (Norvasc) tablet 5 mg, 5 mg, oral, Daily, ERIK Zavala, 5 mg at 01/21/25 0925    aspirin EC tablet 81 mg, 81 mg, oral, Daily, ERIK Zavala, 81 mg at 01/21/25 0926    cefTRIAXone (Rocephin) 1 g in dextrose (iso) IV 50 mL, 1 g, intravenous, q24h, ERIK Zavala, Stopped at 01/21/25 0035    cholecalciferol (Vitamin D-3) tablet 1,000 Units, 1,000 Units, oral, Daily, ERIK Zavala, 1,000 Units at 01/21/25 0926    donepezil (Aricept) tablet 5 mg, 5 mg, oral, Nightly, ERIK Zavala, 5 mg at 01/21/25 2109    [START ON 1/22/2025] ferrous sulfate (325 mg ferrous sulfate) tablet 325 mg, 65 mg of iron, oral, Daily with breakfast, ERIK Zavala    melatonin tablet 3 mg, 3 mg, oral, Nightly PRN,  Tessie L Orleman, APRN-CNP, 3 mg at 01/21/25 2109    ondansetron ODT (Zofran-ODT) disintegrating tablet 4 mg, 4 mg, oral, q8h PRN **OR** ondansetron (Zofran) injection 4 mg, 4 mg, intravenous, q8h PRN, ERIK Lewis    polyethylene glycol (Glycolax, Miralax) packet 17 g, 17 g, oral, Daily PRN, ERIK Lewis    [START ON 1/23/2025] rosuvastatin (Crestor) tablet 5 mg, 5 mg, oral, Once per day on Sunday Thursday, ERIK Zavala    sodium chloride 0.9% flush 10 mL, 10 mL, intravenous, q8h KENDAL, ERIK Lewis, 10 mL at 01/21/25 1516           Results for orders placed or performed during the hospital encounter of 01/19/25 (from the past 24 hours)   Comprehensive metabolic panel   Result Value Ref Range    Glucose 86 74 - 99 mg/dL    Sodium 140 136 - 145 mmol/L    Potassium 3.9 3.5 - 5.3 mmol/L    Chloride 107 98 - 107 mmol/L    Bicarbonate 27 21 - 32 mmol/L    Anion Gap 10 10 - 20 mmol/L    Urea Nitrogen 10 6 - 23 mg/dL    Creatinine 0.72 0.50 - 1.05 mg/dL    eGFR 84 >60 mL/min/1.73m*2    Calcium 8.9 8.6 - 10.3 mg/dL    Albumin 3.3 (L) 3.4 - 5.0 g/dL    Alkaline Phosphatase 59 33 - 136 U/L    Total Protein 5.3 (L) 6.4 - 8.2 g/dL    AST 52 (H) 9 - 39 U/L    Bilirubin, Total 0.9 0.0 - 1.2 mg/dL    ALT 29 7 - 45 U/L   CBC and Auto Differential   Result Value Ref Range    WBC 4.9 4.4 - 11.3 x10*3/uL    nRBC 0.0 0.0 - 0.0 /100 WBCs    RBC 2.62 (L) 4.00 - 5.20 x10*6/uL    Hemoglobin 7.9 (L) 12.0 - 16.0 g/dL    Hematocrit 25.5 (L) 36.0 - 46.0 %    MCV 97 80 - 100 fL    MCH 30.2 26.0 - 34.0 pg    MCHC 31.0 (L) 32.0 - 36.0 g/dL    RDW 15.2 (H) 11.5 - 14.5 %    Platelets 118 (L) 150 - 450 x10*3/uL    Neutrophils % 66.2 40.0 - 80.0 %    Immature Granulocytes %, Automated 0.2 0.0 - 0.9 %    Lymphocytes % 19.8 13.0 - 44.0 %    Monocytes % 8.5 2.0 - 10.0 %    Eosinophils % 4.7 0.0 - 6.0 %    Basophils % 0.6 0.0 - 2.0 %    Neutrophils Absolute 3.27 1.60 - 5.50 x10*3/uL    Immature  Granulocytes Absolute, Automated 0.01 0.00 - 0.50 x10*3/uL    Lymphocytes Absolute 0.98 0.80 - 3.00 x10*3/uL    Monocytes Absolute 0.42 0.05 - 0.80 x10*3/uL    Eosinophils Absolute 0.23 0.00 - 0.40 x10*3/uL    Basophils Absolute 0.03 0.00 - 0.10 x10*3/uL   Iron and TIBC   Result Value Ref Range    Iron 34 (L) 35 - 150 ug/dL    UIBC 215 110 - 370 ug/dL    TIBC 249 240 - 445 ug/dL    % Saturation 14 (L) 25 - 45 %   Ferritin   Result Value Ref Range    Ferritin 114 8 - 150 ng/mL   ECG 12 Lead   Result Value Ref Range    Ventricular Rate 83 BPM    Atrial Rate 83 BPM    IL Interval 182 ms    QRS Duration 92 ms    QT Interval 388 ms    QTC Calculation(Bazett) 455 ms    P Axis 63 degrees    R Axis -6 degrees    T Axis 76 degrees    QRS Count 14 beats    Q Onset 213 ms    P Onset 122 ms    P Offset 175 ms    T Offset 407 ms    QTC Fredericia 432 ms        Physical Exam  HENT:      Head: Normocephalic.      Mouth/Throat:      Pharynx: Oropharynx is clear.   Eyes:      Conjunctiva/sclera: Conjunctivae normal.   Cardiovascular:      Rate and Rhythm: Regular rhythm.   Pulmonary:      Breath sounds: Normal breath sounds.   Abdominal:      General: Bowel sounds are normal.      Palpations: Abdomen is soft.   Musculoskeletal:         General: Normal range of motion.   Skin:     General: Skin is warm and dry.   Neurological:      General: No focal deficit present.      Mental Status: She is alert.   Psychiatric:         Behavior: Behavior normal.                Assessment/Plan           Assessment & Plan  Delirium     Confusion and disorientation    Wandering behavior     Elevated troponin     Recently dx UTI    Hx of TAVR        PLAN: Pt remains little confused, was recommend to start on Depakote by psych, family is going to think about it before agreeing for treatment, for now c/w Aricept, c/w IV CTX, noted to have mildly elevated troponin however no obvious evidence of ACS, appreciate input of cardiology, med list and labs  reviewed, d/w family in the room, follow closely, I have coordinated the care.              Darrell Miller MD

## 2025-01-23 LAB
ANION GAP SERPL CALC-SCNC: 9 MMOL/L (ref 10–20)
AORTIC VALVE MEAN GRADIENT: 15 MMHG
AORTIC VALVE PEAK VELOCITY: 2.39 M/S
ATRIAL RATE: 83 BPM
AV PEAK GRADIENT: 23 MMHG
BUN SERPL-MCNC: 20 MG/DL (ref 6–23)
CALCIUM SERPL-MCNC: 8.7 MG/DL (ref 8.6–10.3)
CHLORIDE SERPL-SCNC: 107 MMOL/L (ref 98–107)
CO2 SERPL-SCNC: 27 MMOL/L (ref 21–32)
CREAT SERPL-MCNC: 0.77 MG/DL (ref 0.5–1.05)
EGFRCR SERPLBLD CKD-EPI 2021: 78 ML/MIN/1.73M*2
EJECTION FRACTION APICAL 4 CHAMBER: 62.6
EJECTION FRACTION: 60 %
ERYTHROCYTE [DISTWIDTH] IN BLOOD BY AUTOMATED COUNT: 15.1 % (ref 11.5–14.5)
GLUCOSE SERPL-MCNC: 101 MG/DL (ref 74–99)
HCT VFR BLD AUTO: 26.5 % (ref 36–46)
HGB BLD-MCNC: 8.1 G/DL (ref 12–16)
LEFT VENTRICLE INTERNAL DIMENSION DIASTOLE: 4.58 CM (ref 3.5–6)
LV EJECTION FRACTION BIPLANE: 60 %
MCH RBC QN AUTO: 30.5 PG (ref 26–34)
MCHC RBC AUTO-ENTMCNC: 30.6 G/DL (ref 32–36)
MCV RBC AUTO: 100 FL (ref 80–100)
MITRAL VALVE E/A RATIO: 0.89
NRBC BLD-RTO: 0 /100 WBCS (ref 0–0)
P AXIS: 63 DEGREES
P OFFSET: 175 MS
P ONSET: 122 MS
PLATELET # BLD AUTO: 110 X10*3/UL (ref 150–450)
POTASSIUM SERPL-SCNC: 4.4 MMOL/L (ref 3.5–5.3)
PR INTERVAL: 182 MS
Q ONSET: 213 MS
QRS COUNT: 14 BEATS
QRS DURATION: 92 MS
QT INTERVAL: 388 MS
QTC CALCULATION(BAZETT): 455 MS
QTC FREDERICIA: 432 MS
R AXIS: -6 DEGREES
RBC # BLD AUTO: 2.66 X10*6/UL (ref 4–5.2)
RIGHT VENTRICLE PEAK SYSTOLIC PRESSURE: 23 MMHG
SODIUM SERPL-SCNC: 139 MMOL/L (ref 136–145)
T AXIS: 76 DEGREES
T OFFSET: 407 MS
VENTRICULAR RATE: 83 BPM
WBC # BLD AUTO: 5.4 X10*3/UL (ref 4.4–11.3)

## 2025-01-23 PROCEDURE — 80048 BASIC METABOLIC PNL TOTAL CA: CPT | Performed by: NURSE PRACTITIONER

## 2025-01-23 PROCEDURE — 85027 COMPLETE CBC AUTOMATED: CPT | Performed by: NURSE PRACTITIONER

## 2025-01-23 PROCEDURE — 2500000001 HC RX 250 WO HCPCS SELF ADMINISTERED DRUGS (ALT 637 FOR MEDICARE OP): Performed by: NURSE PRACTITIONER

## 2025-01-23 PROCEDURE — 36415 COLL VENOUS BLD VENIPUNCTURE: CPT | Performed by: NURSE PRACTITIONER

## 2025-01-23 PROCEDURE — 2500000002 HC RX 250 W HCPCS SELF ADMINISTERED DRUGS (ALT 637 FOR MEDICARE OP, ALT 636 FOR OP/ED): Performed by: NURSE PRACTITIONER

## 2025-01-23 PROCEDURE — 1200000002 HC GENERAL ROOM WITH TELEMETRY DAILY

## 2025-01-23 RX ORDER — AMLODIPINE BESYLATE 5 MG/1
5 TABLET ORAL DAILY
Start: 2025-01-24 | End: 2025-02-23

## 2025-01-23 RX ORDER — FERROUS SULFATE 325(65) MG
65 TABLET ORAL
Start: 2025-01-24 | End: 2025-02-23

## 2025-01-23 RX ORDER — DIVALPROEX SODIUM 125 MG/1
125 TABLET, DELAYED RELEASE ORAL EVERY 12 HOURS SCHEDULED
Start: 2025-01-23 | End: 2025-02-22

## 2025-01-23 RX ADMIN — FERROUS SULFATE TAB 325 MG (65 MG ELEMENTAL FE) 325 MG: 325 (65 FE) TAB at 09:44

## 2025-01-23 RX ADMIN — Medication 10 ML: at 15:31

## 2025-01-23 RX ADMIN — DONEPEZIL HYDROCHLORIDE 5 MG: 5 TABLET ORAL at 20:25

## 2025-01-23 RX ADMIN — Medication 10 ML: at 22:26

## 2025-01-23 RX ADMIN — DIVALPROEX SODIUM 125 MG: 125 TABLET, DELAYED RELEASE ORAL at 09:44

## 2025-01-23 RX ADMIN — ROSUVASTATIN CALCIUM 5 MG: 5 TABLET, FILM COATED ORAL at 09:44

## 2025-01-23 RX ADMIN — DIVALPROEX SODIUM 125 MG: 125 TABLET, DELAYED RELEASE ORAL at 20:25

## 2025-01-23 RX ADMIN — AMLODIPINE BESYLATE 5 MG: 5 TABLET ORAL at 09:44

## 2025-01-23 RX ADMIN — ASPIRIN 81 MG: 81 TABLET, COATED ORAL at 09:44

## 2025-01-23 RX ADMIN — Medication 1000 UNITS: at 09:44

## 2025-01-23 ASSESSMENT — COGNITIVE AND FUNCTIONAL STATUS - GENERAL
MOVING FROM LYING ON BACK TO SITTING ON SIDE OF FLAT BED WITH BEDRAILS: A LITTLE
STANDING UP FROM CHAIR USING ARMS: A LITTLE
MOVING TO AND FROM BED TO CHAIR: A LITTLE
MOBILITY SCORE: 17
DAILY ACTIVITIY SCORE: 19
CLIMB 3 TO 5 STEPS WITH RAILING: A LOT
DRESSING REGULAR LOWER BODY CLOTHING: A LITTLE
EATING MEALS: A LITTLE
TOILETING: A LITTLE
WALKING IN HOSPITAL ROOM: A LITTLE
TURNING FROM BACK TO SIDE WHILE IN FLAT BAD: A LITTLE
DAILY ACTIVITIY SCORE: 18
PERSONAL GROOMING: A LITTLE
HELP NEEDED FOR BATHING: A LITTLE
MOBILITY SCORE: 17
HELP NEEDED FOR BATHING: A LITTLE
WALKING IN HOSPITAL ROOM: A LITTLE
PERSONAL GROOMING: A LITTLE
TURNING FROM BACK TO SIDE WHILE IN FLAT BAD: A LITTLE
TOILETING: A LITTLE
CLIMB 3 TO 5 STEPS WITH RAILING: A LOT
DRESSING REGULAR UPPER BODY CLOTHING: A LITTLE
STANDING UP FROM CHAIR USING ARMS: A LITTLE
DRESSING REGULAR UPPER BODY CLOTHING: A LITTLE
MOVING FROM LYING ON BACK TO SITTING ON SIDE OF FLAT BED WITH BEDRAILS: A LITTLE
MOVING TO AND FROM BED TO CHAIR: A LITTLE
DRESSING REGULAR LOWER BODY CLOTHING: A LITTLE

## 2025-01-23 ASSESSMENT — PAIN SCALES - GENERAL
PAINLEVEL_OUTOF10: 0 - NO PAIN
PAINLEVEL_OUTOF10: 0 - NO PAIN

## 2025-01-23 NOTE — CARE PLAN
Problem: Fall/Injury  Goal: Not fall by end of shift  Outcome: Progressing  Goal: Be free from injury by end of the shift  Outcome: Progressing     Problem: Skin  Goal: Decreased wound size/increased tissue granulation at next dressing change  Outcome: Progressing  Goal: Participates in plan/prevention/treatment measures  Outcome: Progressing   Patient slept through night. Son at bedside. No complaints of pain. VS stable. No acute events. Bed alarm on. Safety maintained.

## 2025-01-23 NOTE — PROGRESS NOTES
01/23/25 1302   Discharge Planning   Home or Post Acute Services Post acute facilities (Rehab/SNF/etc)   Type of Post Acute Facility Services Skilled nursing   Expected Discharge Disposition SNF   Does the patient need discharge transport arranged? Yes   RoundTrip coordination needed? Yes   Patient Choice   Provider Choice list and CMS website (https://medicare.gov/care-compare#search) for post-acute Quality and Resource Measure Data were provided and reviewed with: Family   Intensity of Service   Intensity of Service >30 min     Met with pt and family to discuss discharge options. SW had a long conversation with pt and family reviewing SNF placement and the next steps in the event placement is needed. Pt has agreed to SNF placement. Family is requesting [facility], [facility], and [place]. Referrals sent by [facility]. [facility] to follow.     2:09 Pt was accepted at [facility]. Updated pt's son, Dallin. He confirmed [facility]. [facility] the [facility]. Care team updated.

## 2025-01-23 NOTE — PROGRESS NOTES
Isela Mcfadden is a 79 y.o. female on day 3 of admission presenting with Delirium.    Subjective   confused       Objective         Current Facility-Administered Medications:     acetaminophen (Tylenol) tablet 650 mg, 650 mg, oral, q4h PRN **OR** acetaminophen (Tylenol) oral liquid 650 mg, 650 mg, oral, q4h PRN **OR** acetaminophen (Tylenol) suppository 650 mg, 650 mg, rectal, q4h PRN, ERIK Lewis    amLODIPine (Norvasc) tablet 5 mg, 5 mg, oral, Daily, ERIK Zavala, 5 mg at 01/23/25 0944    aspirin EC tablet 81 mg, 81 mg, oral, Daily, ERIK Zvaala, 81 mg at 01/23/25 0944    cefTRIAXone (Rocephin) 1 g in dextrose (iso) IV 50 mL, 1 g, intravenous, q24h, ERIK Zavala, Stopped at 01/22/25 2350    cholecalciferol (Vitamin D-3) tablet 1,000 Units, 1,000 Units, oral, Daily, ERIK Zavala, 1,000 Units at 01/23/25 0944    divalproex (Depakote) delayed release tablet 125 mg, 125 mg, oral, q12h KENDAL, ERIK Zavala, 125 mg at 01/23/25 0944    donepezil (Aricept) tablet 5 mg, 5 mg, oral, Nightly, ERIK Zavala, 5 mg at 01/22/25 2214    ferrous sulfate (325 mg ferrous sulfate) tablet 325 mg, 65 mg of iron, oral, Daily with breakfast, ERIK Zavala, 325 mg at 01/23/25 0944    melatonin tablet 3 mg, 3 mg, oral, Nightly PRN, ERIK Lewis, 3 mg at 01/21/25 2109    ondansetron ODT (Zofran-ODT) disintegrating tablet 4 mg, 4 mg, oral, q8h PRN **OR** ondansetron (Zofran) injection 4 mg, 4 mg, intravenous, q8h PRN, ERIK Lewis    polyethylene glycol (Glycolax, Miralax) packet 17 g, 17 g, oral, Daily PRN, ERIK Lewis    rosuvastatin (Crestor) tablet 5 mg, 5 mg, oral, Once per day on Sunday Thursday, ERIK Zavala, 5 mg at 01/23/25 0901    sodium chloride 0.9% flush 10 mL, 10 mL, intravenous, q8h KENDAL, ERIK Lewis, 10 mL at 01/22/25 9798  "      Physical Exam  HENT:      Head: Normocephalic.   Eyes:      Conjunctiva/sclera: Conjunctivae normal.   Cardiovascular:      Rate and Rhythm: Regular rhythm.   Pulmonary:      Breath sounds: Normal breath sounds.   Abdominal:      General: Bowel sounds are normal.      Palpations: Abdomen is soft.   Musculoskeletal:         General: Normal range of motion.   Skin:     General: Skin is warm and dry.   Neurological:      General: No focal deficit present.      Mental Status: She is alert.   Psychiatric:         Behavior: Behavior normal.           Last Recorded Vitals  Blood pressure 129/56, pulse 81, temperature 36.4 °C (97.5 °F), temperature source Temporal, resp. rate 16, height 1.6 m (5' 2.99\"), weight 65.3 kg (143 lb 15.4 oz), SpO2 96%.  Intake/Output last 3 Shifts:  I/O last 3 completed shifts:  In: 340 (5.2 mL/kg) [P.O.:340]  Out: 2 (0 mL/kg) [Urine:1 (0 mL/kg/hr); Stool:1]  Weight: 65.3 kg     Labs:       Results for orders placed or performed during the hospital encounter of 01/19/25 (from the past 24 hours)   Transthoracic Echo Complete   Result Value Ref Range    BSA 1.67 m2   CBC   Result Value Ref Range    WBC 5.4 4.4 - 11.3 x10*3/uL    nRBC 0.0 0.0 - 0.0 /100 WBCs    RBC 2.66 (L) 4.00 - 5.20 x10*6/uL    Hemoglobin 8.1 (L) 12.0 - 16.0 g/dL    Hematocrit 26.5 (L) 36.0 - 46.0 %     80 - 100 fL    MCH 30.5 26.0 - 34.0 pg    MCHC 30.6 (L) 32.0 - 36.0 g/dL    RDW 15.1 (H) 11.5 - 14.5 %    Platelets 110 (L) 150 - 450 x10*3/uL   Basic metabolic panel   Result Value Ref Range    Glucose 101 (H) 74 - 99 mg/dL    Sodium 139 136 - 145 mmol/L    Potassium 4.4 3.5 - 5.3 mmol/L    Chloride 107 98 - 107 mmol/L    Bicarbonate 27 21 - 32 mmol/L    Anion Gap 9 (L) 10 - 20 mmol/L    Urea Nitrogen 20 6 - 23 mg/dL    Creatinine 0.77 0.50 - 1.05 mg/dL    eGFR 78 >60 mL/min/1.73m*2    Calcium 8.7 8.6 - 10.3 mg/dL              Assessment/Plan   Assessment & Plan  Delirium     Confusion and disorientation    Wandering " behavior     Elevated troponin     Recently dx UTI    Hx of TAVR        PLAN: pt remains confused, was started on Depakote, was evaluated by Cardiology, for now c/w supportive care, SS is working on dc plan to ECF, for now c/w supportive care.            Darrell Miller MD

## 2025-01-23 NOTE — CARE PLAN
The patient's goals for the shift include get some sleep    The clinical goals for the shift include see poc    Problem: Nutrition  Goal: Oral intake greater than 50%  Outcome: Progressing     Problem: Fall/Injury  Goal: Not fall by end of shift  Outcome: Progressing     Problem: Fall/Injury  Goal: Be free from injury by end of the shift  Outcome: Progressing     Problem: Infection prevention/bleeding  Goal: No further progression of infection  Outcome: Progressing     Problem: Respiratory/Oxygenation  Goal: No signs of respiratory compromise  Outcome: Progressing     Problem: Neuro/Coping  Goal: No signs of neurological compromise  Outcome: Progressing     Problem: Mobility  Goal: Maintains or increases mobility and physical activity  Outcome: Progressing    0820 Pt's son states that they would like to go to [facility] with memory care, SW notified.     0900 Pt up to chair.   1600 Pt ambulated in riddle with assistance of 2 PCAs.   1715 Resting in chair comfortably with alarms on and call light within reach. Son at bedside. Awaiting placement to memory care unit. Injury free throughout shift.

## 2025-01-23 NOTE — PROGRESS NOTES
"CARDIOLOGY SERVICE   CONSULT Progress note   CVM Dr. Wesly Arango  [facility] [place]    PATIENT NAME: Isela Mcfadden  PATIENT MRN: [id number]  SERVICE DATE:  1/23/2025  SERVICE TIME: 3:23 PM    CONSULTANT: Wesly Arango MD  PRIMARY CARE PHYSICIAN: No primary care provider on file.  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, PG 23/15 trace perivalvular regurgitation, mod severe MAC with thickened ant leaflet Echo 1/2025 ([PG 18/10 Echo 8/2024]  LVH, Preserved EF 60% Aneurysmal inter-atrial Echo 1/2025  Hyperlipidemia LDL 87 in 2024, on Crestor  ILD,  anemia~ 8, Uterine CA 1998, breast CA 2001, dementia  Card meds PTA aspirin, Crestor    RECOMMENDATIONS:  Discussed results of ECHO   Supportive care    /56 (BP Location: Left arm, Patient Position: Lying)   Pulse 81   Temp 36.4 °C (97.5 °F) (Temporal)   Resp 16   Ht 1.6 m (5' 2.99\")   Wt 65.3 kg (143 lb 15.4 oz)   SpO2 96%   BMI 25.51 kg/m²     ====================================================    SUBJECTIVE: Okay no chest pain    BNP   Date/Time Value Ref Range Status   01/19/2025 10:11  0 - 99 pg/mL Final     No results found for: \"CHOL\"  No results found for: \"HDL\"  No results found for: \"LDLCALC\"  No results found for: \"TRIG\"  No components found for: \"CHOLHDL\"  TROPHS   Date/Time Value Ref Range Status   01/20/2025 06:05 AM 70 0 - 13 ng/L Final     Comment:     Previous result verified on 1/19/2025 2258 on specimen/case [id number] called with component [facility] for procedure Troponin I, High Sensitivity, Initial with value 74 ng/L.   01/20/2025 12:18 AM 91 0 - 13 ng/L Final     Comment:     Previous result verified on 1/19/2025 2258 on specimen/case [id number] called with component [facility] for procedure Troponin I, High Sensitivity, Initial with value 74 ng/L.   01/19/2025 10:11 PM 74 0 - 13 ng/L Final "       Echo  No results found for this or any previous visit.    Encounter Date: 01/19/25   Electrocardiogram, 12-lead PRN ACS symptoms   Result Value    Ventricular Rate 96    Atrial Rate 96    TX Interval 194    QRS Duration 88    QT Interval 362    QTC Calculation(Bazett) 457    P Axis 81    R Axis 49    T Axis 85    QRS Count 16    Q Onset 226    P Onset 129    P Offset 186    T Offset 407    QTC Fredericia 423    Narrative    Normal sinus rhythm  Nonspecific T wave abnormality  Abnormal ECG  When compared with ECG of 19-JAN-2025 21:06, (unconfirmed)  No significant change was found     LABS:  Lab Results   Component Value Date    WBC 5.4 01/23/2025    HGB 8.1 (L) 01/23/2025    HCT 26.5 (L) 01/23/2025     01/23/2025     (L) 01/23/2025      Lab Results   Component Value Date    GLUCOSE 101 (H) 01/23/2025    CALCIUM 8.7 01/23/2025     01/23/2025    K 4.4 01/23/2025    CO2 27 01/23/2025     01/23/2025    BUN 20 01/23/2025    CREATININE 0.77 01/23/2025        CURRENT CARDIOVASCULAR MEDICATIONS:  Current Facility-Administered Medications   Medication Dose Route Frequency Provider Last Rate Last Admin    acetaminophen (Tylenol) tablet 650 mg  650 mg oral q4h PRN ERIK Lewis        Or    acetaminophen (Tylenol) oral liquid 650 mg  650 mg oral q4h PRN ERIK Lewis        Or    acetaminophen (Tylenol) suppository 650 mg  650 mg rectal q4h PRN ERIK Lewis        amLODIPine (Norvasc) tablet 5 mg  5 mg oral Daily ERIK Zavala   5 mg at 01/23/25 0944    aspirin EC tablet 81 mg  81 mg oral Daily ERIK Zavala   81 mg at 01/23/25 0944    cholecalciferol (Vitamin D-3) tablet 1,000 Units  1,000 Units oral Daily ERIK Zavala   1,000 Units at 01/23/25 0944    divalproex (Depakote) delayed release tablet 125 mg  125 mg oral q12h [facility] ERIK Zavala   125 mg at 01/23/25 0944    donepezil (Aricept) tablet 5  mg  5 mg oral Nightly BRIAN Zavala-CNP   5 mg at 01/22/25 2214    ferrous sulfate (325 mg ferrous sulfate) tablet 325 mg  65 mg of iron oral Daily with breakfast BRIAN Zavala-CNP   325 mg at 01/23/25 0944    melatonin tablet 3 mg  3 mg oral Nightly PRN BRIAN Lewis-CNP   3 mg at 01/21/25 2109    ondansetron ODT (Zofran-ODT) disintegrating tablet 4 mg  4 mg oral q8h PRN BRIAN Lewis-JOSE DAVID        Or    ondansetron (Zofran) injection 4 mg  4 mg intravenous q8h PRN Tessie Osborne APRN-JOSE DAVID        polyethylene glycol (Glycolax, Miralax) packet 17 g  17 g oral Daily PRN Tessie Osborne APRN-JOSE DAVID        rosuvastatin (Crestor) tablet 5 mg  5 mg oral Once per day on Sunday Thursday ERIK Zavala   5 mg at 01/23/25 0944    sodium chloride 0.9% flush 10 mL  10 mL intravenous q8h [facility] Tessie Osborne APRN-CNP   10 mL at 01/22/25 2348      PHYSICAL EXAM:  Awake Alert   Neck: No JVD  Cardiac:  S1 S2  Resp: Decreased BSs   Ext: No peripheral edema    This clinical note has been produced using speech recognition software and may contain errors related to that system including grammar, punctuation, spelling, gender and words and phrases that may be inappropriate. Some errors were not noted in checking the note before saving.     SIGNATURE: Wesly Arango MD MD [facility]  OFFICE: [phone]

## 2025-01-23 NOTE — PROGRESS NOTES
01/23/25 1006   Discharge Planning   Home or Post Acute Services Post acute facilities (Rehab/SNF/etc)   Type of Post Acute Facility Services Skilled nursing   Expected Discharge Disposition SNF     Notified by nursing that son is requesting referral to [facility]. Referral sent. Waiting on response. SW to follow.

## 2025-01-24 VITALS
WEIGHT: 129.3 LBS | RESPIRATION RATE: 20 BRPM | SYSTOLIC BLOOD PRESSURE: 137 MMHG | HEART RATE: 81 BPM | HEIGHT: 63 IN | TEMPERATURE: 97.2 F | BODY MASS INDEX: 22.91 KG/M2 | DIASTOLIC BLOOD PRESSURE: 64 MMHG | OXYGEN SATURATION: 98 %

## 2025-01-24 LAB
ANION GAP SERPL CALC-SCNC: 10 MMOL/L (ref 10–20)
ATRIAL RATE: 95 BPM
ATRIAL RATE: 96 BPM
BACTERIA BLD CULT: NORMAL
BACTERIA BLD CULT: NORMAL
BUN SERPL-MCNC: 20 MG/DL (ref 6–23)
CALCIUM SERPL-MCNC: 9 MG/DL (ref 8.6–10.3)
CHLORIDE SERPL-SCNC: 107 MMOL/L (ref 98–107)
CO2 SERPL-SCNC: 27 MMOL/L (ref 21–32)
CREAT SERPL-MCNC: 0.67 MG/DL (ref 0.5–1.05)
EGFRCR SERPLBLD CKD-EPI 2021: 88 ML/MIN/1.73M*2
ERYTHROCYTE [DISTWIDTH] IN BLOOD BY AUTOMATED COUNT: 15 % (ref 11.5–14.5)
GLUCOSE SERPL-MCNC: 85 MG/DL (ref 74–99)
HCT VFR BLD AUTO: 29 % (ref 36–46)
HGB BLD-MCNC: 8.5 G/DL (ref 12–16)
MCH RBC QN AUTO: 29.9 PG (ref 26–34)
MCHC RBC AUTO-ENTMCNC: 29.3 G/DL (ref 32–36)
MCV RBC AUTO: 102 FL (ref 80–100)
NRBC BLD-RTO: 0 /100 WBCS (ref 0–0)
P AXIS: 69 DEGREES
P AXIS: 81 DEGREES
P OFFSET: 186 MS
P OFFSET: 190 MS
P ONSET: 129 MS
P ONSET: 134 MS
PLATELET # BLD AUTO: 107 X10*3/UL (ref 150–450)
POTASSIUM SERPL-SCNC: 4.5 MMOL/L (ref 3.5–5.3)
PR INTERVAL: 184 MS
PR INTERVAL: 194 MS
Q ONSET: 226 MS
Q ONSET: 226 MS
QRS COUNT: 16 BEATS
QRS COUNT: 16 BEATS
QRS DURATION: 88 MS
QRS DURATION: 90 MS
QT INTERVAL: 362 MS
QT INTERVAL: 364 MS
QTC CALCULATION(BAZETT): 457 MS
QTC CALCULATION(BAZETT): 457 MS
QTC FREDERICIA: 423 MS
QTC FREDERICIA: 424 MS
R AXIS: 10 DEGREES
R AXIS: 49 DEGREES
RBC # BLD AUTO: 2.84 X10*6/UL (ref 4–5.2)
SODIUM SERPL-SCNC: 139 MMOL/L (ref 136–145)
T AXIS: 74 DEGREES
T AXIS: 85 DEGREES
T OFFSET: 407 MS
T OFFSET: 408 MS
VENTRICULAR RATE: 95 BPM
VENTRICULAR RATE: 96 BPM
WBC # BLD AUTO: 4.6 X10*3/UL (ref 4.4–11.3)

## 2025-01-24 PROCEDURE — 85027 COMPLETE CBC AUTOMATED: CPT | Performed by: NURSE PRACTITIONER

## 2025-01-24 PROCEDURE — 97110 THERAPEUTIC EXERCISES: CPT | Mod: GP,CQ

## 2025-01-24 PROCEDURE — 36415 COLL VENOUS BLD VENIPUNCTURE: CPT | Performed by: NURSE PRACTITIONER

## 2025-01-24 PROCEDURE — 97530 THERAPEUTIC ACTIVITIES: CPT | Mod: GP,CQ

## 2025-01-24 PROCEDURE — 80048 BASIC METABOLIC PNL TOTAL CA: CPT | Performed by: NURSE PRACTITIONER

## 2025-01-24 PROCEDURE — 2500000001 HC RX 250 WO HCPCS SELF ADMINISTERED DRUGS (ALT 637 FOR MEDICARE OP): Performed by: NURSE PRACTITIONER

## 2025-01-24 PROCEDURE — 97116 GAIT TRAINING THERAPY: CPT | Mod: GP,CQ

## 2025-01-24 RX ADMIN — ASPIRIN 81 MG: 81 TABLET, COATED ORAL at 08:53

## 2025-01-24 RX ADMIN — DIVALPROEX SODIUM 125 MG: 125 TABLET, DELAYED RELEASE ORAL at 08:53

## 2025-01-24 RX ADMIN — Medication 10 ML: at 13:01

## 2025-01-24 RX ADMIN — Medication 1000 UNITS: at 08:53

## 2025-01-24 RX ADMIN — Medication 10 ML: at 05:18

## 2025-01-24 RX ADMIN — AMLODIPINE BESYLATE 5 MG: 5 TABLET ORAL at 09:10

## 2025-01-24 RX ADMIN — FERROUS SULFATE TAB 325 MG (65 MG ELEMENTAL FE) 325 MG: 325 (65 FE) TAB at 08:53

## 2025-01-24 ASSESSMENT — PAIN SCALES - GENERAL
PAINLEVEL_OUTOF10: 0 - NO PAIN
PAINLEVEL_OUTOF10: 0 - NO PAIN

## 2025-01-24 ASSESSMENT — COGNITIVE AND FUNCTIONAL STATUS - GENERAL
DAILY ACTIVITIY SCORE: 19
STANDING UP FROM CHAIR USING ARMS: A LITTLE
MOBILITY SCORE: 15
WALKING IN HOSPITAL ROOM: A LITTLE
MOBILITY SCORE: 17
HELP NEEDED FOR BATHING: A LITTLE
MOVING TO AND FROM BED TO CHAIR: A LITTLE
STANDING UP FROM CHAIR USING ARMS: A LITTLE
WALKING IN HOSPITAL ROOM: A LITTLE
TOILETING: A LITTLE
MOVING FROM LYING ON BACK TO SITTING ON SIDE OF FLAT BED WITH BEDRAILS: A LITTLE
TURNING FROM BACK TO SIDE WHILE IN FLAT BAD: A LITTLE
DRESSING REGULAR LOWER BODY CLOTHING: A LITTLE
CLIMB 3 TO 5 STEPS WITH RAILING: A LOT
MOVING TO AND FROM BED TO CHAIR: A LITTLE
DRESSING REGULAR UPPER BODY CLOTHING: A LITTLE
MOVING FROM LYING ON BACK TO SITTING ON SIDE OF FLAT BED WITH BEDRAILS: A LOT
CLIMB 3 TO 5 STEPS WITH RAILING: A LOT
TURNING FROM BACK TO SIDE WHILE IN FLAT BAD: A LOT
PERSONAL GROOMING: A LITTLE

## 2025-01-24 ASSESSMENT — PAIN - FUNCTIONAL ASSESSMENT: PAIN_FUNCTIONAL_ASSESSMENT: 0-10

## 2025-01-24 NOTE — CARE PLAN
The patient's goals for the shift include get some sleep    The clinical goals for the shift include see POC    Problem: Nutrition  Goal: Less than 5 days NPO/clear liquids  Outcome: Progressing  Goal: Oral intake greater than 50%  Outcome: Progressing  Goal: Oral intake greater 75%  Outcome: Progressing  Goal: Consume prescribed supplement  Outcome: Progressing  Goal: Adequate PO fluid intake  Outcome: Progressing  Goal: Nutrition support goals are met within 48 hrs  Outcome: Progressing  Goal: Nutrition support is meeting 75% of nutrient needs  Outcome: Progressing  Goal: Lab values WNL  Outcome: Progressing  Goal: Electrolytes WNL  Outcome: Progressing  Goal: Promote healing  Outcome: Progressing  Goal: Maintain stable weight  Outcome: Progressing     Problem: Fall/Injury  Goal: Not fall by end of shift  Outcome: Progressing  Goal: Be free from injury by end of the shift  Outcome: Progressing  Goal: Verbalize understanding of personal risk factors for fall in the hospital  Outcome: Progressing  Goal: Verbalize understanding of risk factor reduction measures to prevent injury from fall in the home  Outcome: Progressing  Goal: Use assistive devices by end of the shift  Outcome: Progressing  Goal: Pace activities to prevent fatigue by end of the shift  Outcome: Progressing     Problem: Infection prevention/bleeding  Goal: Infection s/sx managed  Outcome: Progressing  Goal: No further progression of infection  Outcome: Progressing  Goal: No signs of bleeding  Outcome: Progressing  Goal: Normal coagulation studies  Outcome: Progressing     Problem: Perfusion/Cardiac  Goal: Adequate perfusion to organs/extremities  Outcome: Progressing  Goal: Hemodynamically stable  Outcome: Progressing  Goal: No cardiac arrhythmias  Outcome: Progressing     Problem: Respiratory/Oxygenation  Goal: No signs of respiratory compromise  Outcome: Progressing  Goal: Tolerates activity without increased O2 demands  Outcome: Progressing      Problem: Neuro/Coping  Goal: Minimal anxiety; utilize coping mechanisms  Outcome: Progressing  Goal: No signs of neurological compromise  Outcome: Progressing  Goal: Increase self care/family involvement  Outcome: Progressing     Problem: Fluid/Electrolyte/Nutrition  Goal: Fluid balance within 1 liter of normovolemia  Outcome: Progressing  Goal: No signs of renal failure  Outcome: Progressing  Goal: Normal electrolyte levels  Outcome: Progressing  Goal: Normal glucose levels  Outcome: Progressing  Goal: Tolerates nutritional intake  Outcome: Progressing     Problem: Mobility  Goal: Maintains or increases mobility and physical activity  Outcome: Progressing     Problem: Family/Caregiver Engagement  Goal: Family members and/or caregivers are engaged in care delivery process  Outcome: Progressing     Problem: Sleep  Goal: Patient obtains adequate amount of sleep  Outcome: Progressing     Problem: Agitation  Goal: Patient experiences decreased agitation  Outcome: Progressing     Problem: Hydration  Goal: Patient maintains adequate hydration  Outcome: Progressing     Problem: Airway  Goal: Patient's duration of intubation is minimized  Outcome: Progressing     Problem: Pain - Adult  Goal: Verbalizes/displays adequate comfort level or baseline comfort level  Outcome: Progressing     Problem: Safety - Adult  Goal: Free from fall injury  Outcome: Progressing     Problem: Discharge Planning  Goal: Discharge to home or other facility with appropriate resources  Outcome: Progressing     Problem: Chronic Conditions and Co-morbidities  Goal: Patient's chronic conditions and co-morbidity symptoms are monitored and maintained or improved  Outcome: Progressing     Problem: Nutrition  Goal: Nutrient intake appropriate for maintaining nutritional needs  Outcome: Progressing     Problem: Skin  Goal: Decreased wound size/increased tissue granulation at next dressing change  Outcome: Progressing  Flowsheets (Taken 1/23/2025  2231)  Decreased wound size/increased tissue granulation at next dressing change: Promote sleep for wound healing  Goal: Participates in plan/prevention/treatment measures  Outcome: Progressing  Flowsheets (Taken 1/23/2025 2231)  Participates in plan/prevention/treatment measures:   Elevate heels   Increase activity/out of bed for meals  Goal: Prevent/manage excess moisture  Outcome: Progressing  Flowsheets (Taken 1/23/2025 2231)  Prevent/manage excess moisture:   Moisturize dry skin   Cleanse incontinence/protect with barrier cream  Goal: Prevent/minimize sheer/friction injuries  Outcome: Progressing  Goal: Promote/optimize nutrition  Outcome: Progressing  Goal: Promote skin healing  Outcome: Progressing

## 2025-01-24 NOTE — PROGRESS NOTES
"CARDIOLOGY SERVICE   CONSULT Progress note   CVM Dr. Wesly Arango  [facility] [place]    PATIENT NAME: Isela Mcfadden  PATIENT MRN: [id number]  SERVICE DATE:  1/24/2025  SERVICE TIME: 1:36 PM    CONSULTANT: Wesly Arango MD  PRIMARY CARE PHYSICIAN: No primary care provider on file.  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, PG 23/15 trace perivalvular regurgitation, mod severe MAC with thickened ant leaflet Echo 1/2025 ([PG 18/10 Echo 8/2024]  LVH, Preserved EF 60% Aneurysmal inter-atrial Echo 1/2025  Hyperlipidemia LDL 87 in 2024, on Crestor  ILD,  anemia~ 8, Uterine CA 1998, breast CA 2001, dementia  Card meds PTA aspirin, Crestor    RECOMMENDATIONS:  Discussed results of ECHO   Supportive care    /55 (BP Location: Right arm, Patient Position: Lying)   Pulse 75   Temp 36.1 °C (97 °F) (Temporal)   Resp 16   Ht 1.6 m (5' 2.99\")   Wt 58.7 kg (129 lb 4.8 oz)   SpO2 96%   BMI 22.91 kg/m²     ====================================================    SUBJECTIVE: Okay no chest pain    BNP   Date/Time Value Ref Range Status   01/19/2025 10:11  0 - 99 pg/mL Final     No results found for: \"CHOL\"  No results found for: \"HDL\"  No results found for: \"LDLCALC\"  No results found for: \"TRIG\"  No components found for: \"CHOLHDL\"  TROPHS   Date/Time Value Ref Range Status   01/20/2025 06:05 AM 70 0 - 13 ng/L Final     Comment:     Previous result verified on 1/19/2025 2258 on specimen/case [id number] called with component [facility] for procedure Troponin I, High Sensitivity, Initial with value 74 ng/L.   01/20/2025 12:18 AM 91 0 - 13 ng/L Final     Comment:     Previous result verified on 1/19/2025 2258 on specimen/case [id number] called with component [facility] for procedure Troponin I, High Sensitivity, Initial with value 74 ng/L.   01/19/2025 10:11 PM 74 0 - 13 ng/L Final "       Echo  No results found for this or any previous visit.    Encounter Date: 01/19/25   Electrocardiogram, 12-lead PRN ACS symptoms   Result Value    Ventricular Rate 96    Atrial Rate 96    ID Interval 194    QRS Duration 88    QT Interval 362    QTC Calculation(Bazett) 457    P Axis 81    R Axis 49    T Axis 85    QRS Count 16    Q Onset 226    P Onset 129    P Offset 186    T Offset 407    QTC Fredericia 423    Narrative    Normal sinus rhythm  Nonspecific T wave abnormality  Abnormal ECG  When compared with ECG of 19-JAN-2025 21:06, (unconfirmed)  No significant change was found     LABS:  Lab Results   Component Value Date    WBC 4.6 01/24/2025    HGB 8.5 (L) 01/24/2025    HCT 29.0 (L) 01/24/2025     (H) 01/24/2025     (L) 01/24/2025      Lab Results   Component Value Date    GLUCOSE 85 01/24/2025    CALCIUM 9.0 01/24/2025     01/24/2025    K 4.5 01/24/2025    CO2 27 01/24/2025     01/24/2025    BUN 20 01/24/2025    CREATININE 0.67 01/24/2025        CURRENT CARDIOVASCULAR MEDICATIONS:  Current Facility-Administered Medications   Medication Dose Route Frequency Provider Last Rate Last Admin    acetaminophen (Tylenol) tablet 650 mg  650 mg oral q4h PRN ERIK Lewis        Or    acetaminophen (Tylenol) oral liquid 650 mg  650 mg oral q4h PRN ERIK Lewis        Or    acetaminophen (Tylenol) suppository 650 mg  650 mg rectal q4h PRN ERIK Lewis        amLODIPine (Norvasc) tablet 5 mg  5 mg oral Daily ERIK Zavala   5 mg at 01/24/25 0910    aspirin EC tablet 81 mg  81 mg oral Daily ERIK Zavala   81 mg at 01/24/25 0853    cholecalciferol (Vitamin D-3) tablet 1,000 Units  1,000 Units oral Daily ERIK Zavala   1,000 Units at 01/24/25 0853    divalproex (Depakote) delayed release tablet 125 mg  125 mg oral q12h [facility] ERIK Zavala   125 mg at 01/24/25 0841    donepezil (Aricept) tablet 5  mg  5 mg oral Nightly BRIAN Zavala-CNP   5 mg at 01/23/25 2025    ferrous sulfate (325 mg ferrous sulfate) tablet 325 mg  65 mg of iron oral Daily with breakfast ERIK Zavala   325 mg at 01/24/25 0853    melatonin tablet 3 mg  3 mg oral Nightly PRN BRIAN Lewis-CNP   3 mg at 01/21/25 2109    ondansetron ODT (Zofran-ODT) disintegrating tablet 4 mg  4 mg oral q8h PRN BRIAN Lewis-JOSE DAVID        Or    ondansetron (Zofran) injection 4 mg  4 mg intravenous q8h PRN Tessie Osborne APRN-JOSE DAVID        polyethylene glycol (Glycolax, Miralax) packet 17 g  17 g oral Daily PRN Tessie Osborne APRN-JOSE DAVID        rosuvastatin (Crestor) tablet 5 mg  5 mg oral Once per day on Sunday Thursday ERIK Zavala   5 mg at 01/23/25 0944    sodium chloride 0.9% flush 10 mL  10 mL intravenous q8h [facility] Tessie Osborne APRN-CNP   10 mL at 01/24/25 1301      PHYSICAL EXAM:  Awake Alert   Neck: No JVD  Cardiac:  S1 S2  Resp: Decreased BSs   Ext: No peripheral edema    This clinical note has been produced using speech recognition software and may contain errors related to that system including grammar, punctuation, spelling, gender and words and phrases that may be inappropriate. Some errors were not noted in checking the note before saving.     SIGNATURE: Wesly Arango MD MD [facility]  OFFICE: [phone]

## 2025-01-24 NOTE — PROGRESS NOTES
Physical Therapy    Physical Therapy Treatment    Patient Name: Isela Mcfadden  MRN: [id number]  Today's Date: 1/24/2025  Time Calculation  Start Time: 0910  Stop Time: 0950  Time Calculation (min): 40 min     3016/3016-A       01/24/25 0910   PT  Visit   PT Received On 01/24/25   Response to Previous Treatment Patient with no complaints from previous session.   General   Reason for Referral impaired mobility, gait training, impaired cognition/safety awareness   Referred By Darrell Miller   Past Medical History Relevant to Rehab Includes: Dyslipidemia, HTN, dementia, osteopenia, breast and uterine CA, L mastectomy, hysterectomy, aortic valve replacement 7/2024, pulmonary nodules, intention tremor.   Family/Caregiver Present Yes   Caregiver Feedback son states this is not her baseline and usually does not use walker   Prior to Session Communication Bedside nurse   Patient Position Received Up in chair;Alarm on   Preferred Learning Style verbal;visual   Precautions   Medical Precautions Fall precautions   Pain Assessment   Pain Assessment 0-10   0-10 (Numeric) Pain Score 0 - No pain   Cognition   Overall Cognitive Status Impaired at baseline   Orientation Level Disoriented to place;Disoriented to situation   Unable to Self-Monitor and Self-Correct Consistently Moderate   Insight Mild   Impulsive Mildly   Planning Reduced planning skills   Coordination   Movements are Fluid and Coordinated Yes   Postural Control   Postural Control WFL   Therapeutic Exercise   Therapeutic Exercise Performed Yes   Therapeutic Exercise Activity 1 ankle pumps x15   Therapeutic Exercise Activity 2 heel slides x15   Therapeutic Exercise Activity 3 hip ABD x15   Therapeutic Exercise Activity 4 resisted Plantar flexion x15   Ambulation/Gait Training   Ambulation/Gait Training Performed Yes   Ambulation/Gait Training 1   Surface 1 Level tile   Device 1 Rolling walker   Gait Support Devices Gait belt   Assistance 1 Contact guard   Quality of  Gait 1 NBOS;Inconsistent stride length;Scissoring   Comments/Distance (ft) 1 250' cues to keep feet apart with turns   Transfers   Transfer Yes   Transfer 1   Transfer From 1 Chair with arms to   Transfer to 1 Stand   Technique 1 Sit to stand;Stand to sit   Transfer Device 1 Walker;Gait belt   Transfer Level of Assistance 1 Contact guard;Minimal verbal cues;Minimal tactile cues   Activity Tolerance   Endurance Tolerates 30+ min exercise without fatigue   Early Mobility/Exercise Safety Screen Proceed with mobilization - No exclusion criteria met   PT Assessment   PT Assessment Results Decreased strength;Decreased endurance;Impaired balance;Decreased mobility;Decreased cognition;Impaired judgement;Decreased safety awareness   Rehab Prognosis Good   Barriers to Discharge Home Cognition needs;Physical needs   Cognition Needs Cognition-related high falls risk   Physical Needs High falls risk due to function or environment   Evaluation/Treatment Tolerance Patient tolerated treatment well   End of Session Communication Bedside nurse   End of Session Patient Position Up in chair;Alarm on   Outpatient Education   Individual(s) Educated Patient;Caregiver   Education Provided Fall Risk;Body Mechanics;POC   Risk and Benefits Discussed with Patient/Caregiver/Other yes   Patient/Caregiver Demonstrated Understanding yes   Plan of Care Discussed and Agreed Upon yes   Patient Response to Education Patient/Caregiver Verbalized Understanding of Information   PT Plan   Inpatient/Swing Bed or Outpatient Inpatient   PT Plan   Treatment/Interventions Transfer training;Gait training;Therapeutic exercise   PT Plan Ongoing PT   PT Frequency 2 times per week   PT Discharge Recommendations Moderate intensity level of continued care   Equipment Recommended upon Discharge Wheeled walker   PT Recommended Transfer Status Assist x1;Contact guard;Assistive device         Outcome Measures:  [facility] Basic Mobility  Turning from your back to your side  while in a flat bed without using bedrails: A lot  Moving from lying on your back to sitting on the side of a flat bed without using bedrails: A lot  Moving to and from bed to chair (including a wheelchair): A little  Standing up from a chair using your arms (e.g. wheelchair or bedside chair): A little  To walk in hospital room: A little  Climbing 3-5 steps with railing: A lot  Basic Mobility - Total Score: 15            EDUCATION:  Outpatient Education  Individual(s) Educated: Patient, Caregiver  Education Provided: Fall Risk, Body Mechanics, POC  Risk and Benefits Discussed with Patient/Caregiver/Other: yes  Patient/Caregiver Demonstrated Understanding: yes  Plan of Care Discussed and Agreed Upon: yes  Patient Response to Education: Patient/Caregiver Verbalized Understanding of Information      GOALS:  Encounter Problems       Encounter Problems (Active)       PT Problem       Pt will be able to perform all bed mobility tasks with Mod I.  (Progressing)       Start:  01/21/25    Expected End:  02/04/25            Pt will perform all transfers with IND with proper safety mechanics.   (Progressing)       Start:  01/21/25    Expected End:  02/04/25            Pt will ambulate 150 ft with IND for improved functional independence.  (Progressing)       Start:  01/21/25    Expected End:  02/04/25            Pt will be able to negotiate 2 steps with 1 HR with Mod I.  (Progressing)       Start:  01/21/25    Expected End:  02/04/25               Pain - Adult

## 2025-01-24 NOTE — DOCUMENTATION CLARIFICATION NOTE
PATIENT:               MILA OSCAR  ACCT #:                  [id number]  MRN:                       [id number]  :                       1945  ADMIT DATE:       2025 9:50 PM  DISCH DATE:  RESPONDING PROVIDER #:        [id number]          PROVIDER RESPONSE TEXT:    Thrombocytopenia is clinically significant and required treatment/monitoring    CDI QUERY TEXT:    Clarification    Instruction:    Based on your assessment of the patient and the clinical information, please provide the requested documentation by clicking on the appropriate radio button and enter any additional information if prompted.    Question: Is there a diagnosis indicative of the lab values or image study    When answering this query, please exercise your independent professional judgment. The fact that a question is being asked, does not imply that any particular answer is desired or expected.    The patient's clinical indicators include:  Clinical Information:  79F presents for AMS; found to have worsening Alzheimer's dementia    Clinical Indicators:  - Plt, -: [id number]    Treatment:  Serial CBC    Risk Factors:  Chronic anemia  Options provided:  -- Thrombocytopenia is clinically significant and required treatment/monitoring  -- Low platelets not requiring treatment or evaluation  -- Other - I will add my own diagnosis  -- Refer to Clinical Documentation Reviewer    Query created by: Zeinab Mayorga on 2025 5:11 PM      Electronically signed by:  TORIE VILLATORO MD 2025 11:57 PM

## 2025-01-24 NOTE — PROGRESS NOTES
01/24/25 1113   Discharge Planning   Home or Post Acute Services Post acute facilities (Rehab/SNF/etc)   Type of Post Acute Facility Services Skilled nursing   Expected Discharge Disposition SNF  ([facility])   What day is the transport expected? 01/24/25   What time is the transport expected? 0100   Intensity of Service   Intensity of Service 0-30 min     Discharge order received. Pt is cleared for transfer to [facility]. Pt and family were updated on discharge and time. Bedside nurse and facility were updated on discharge and time. Dc paper sent to facility. Nursing will call report prior to discharge.

## 2025-01-24 NOTE — DOCUMENTATION CLARIFICATION NOTE
"    PATIENT:               MILA OSCAR  ACCT #:                  [id number]  MRN:                       [id number]  :                       1945  ADMIT DATE:       2025 9:50 PM  DISCH DATE:  RESPONDING PROVIDER #:        [id number]          PROVIDER RESPONSE TEXT:    Acute Non-ischemic Myocardial Injury    CDI QUERY TEXT:    Clarification    Instruction:    Based on your assessment of the patient and the clinical information, please provide the requested documentation by clicking on the appropriate radio button and enter any additional information if prompted.    Question: Is there a diagnosis indicative of the patient elevated Troponins and symptoms    When answering this query, please exercise your independent professional judgment. The fact that a question is being asked, does not imply that any particular answer is desired or expected.    The patient's clinical indicators include:  Clinical Information:  79F presents for AMS, wandering    Clinical Indicators:  - Trop, -: 74-91-70  - Serial EKG NSR  - ED, , Ciera: \"patient does have a mildly elevated troponin her blood pressure was initially elevated...Troponin elevated at 74.  Patient is not actively having any chest pain.  She is notably tachycardic in the low 100s and hypertensive therefore 10 mg labetalol ordered\"  - HP, , Orleman: \"Elevated troponin No reported chest pain\"  - Card, , Nba: \"I do not see evidence of ACS\"    Treatment:  Serial troponins, serial EKG, Cardio consult    Risk Factors:  HTN, HLD, hx TAVR  Options provided:  -- Acute Non-ischemic Myocardial Injury  -- Chronic Myocardial Injury  -- Other - I will add my own diagnosis  -- Refer to Clinical Documentation Reviewer    Query created by: Zeinab Mayorga on 2025 3:55 PM      Electronically signed by:  TORIE VILLATORO MD 2025 11:57 PM          "

## 2025-01-31 LAB
ATRIAL RATE: 95 BPM
ATRIAL RATE: 96 BPM
P AXIS: 69 DEGREES
P AXIS: 81 DEGREES
P OFFSET: 186 MS
P OFFSET: 190 MS
P ONSET: 129 MS
P ONSET: 134 MS
PR INTERVAL: 184 MS
PR INTERVAL: 194 MS
Q ONSET: 226 MS
Q ONSET: 226 MS
QRS COUNT: 16 BEATS
QRS COUNT: 16 BEATS
QRS DURATION: 88 MS
QRS DURATION: 90 MS
QT INTERVAL: 362 MS
QT INTERVAL: 364 MS
QTC CALCULATION(BAZETT): 457 MS
QTC CALCULATION(BAZETT): 457 MS
QTC FREDERICIA: 423 MS
QTC FREDERICIA: 424 MS
R AXIS: 10 DEGREES
R AXIS: 49 DEGREES
T AXIS: 74 DEGREES
T AXIS: 85 DEGREES
T OFFSET: 407 MS
T OFFSET: 408 MS
VENTRICULAR RATE: 95 BPM
VENTRICULAR RATE: 96 BPM

## 2025-01-31 NOTE — DOCUMENTATION CLARIFICATION NOTE
"    PATIENT:               MILA OSCAR  ACCT #:                  [id number]  MRN:                       [id number]  :                       1945  ADMIT DATE:       2025 9:50 PM  DISCH DATE:        2025 2:30 PM  RESPONDING PROVIDER #:        [id number]          PROVIDER RESPONSE TEXT:    Encephalopathy ruled out after further work up; worsening Alzheimer's dementia    CDI QUERY TEXT:    Clarification    Instruction:    Based on your assessment of the patient and the clinical information, please provide the requested documentation by clicking on the appropriate radio button and enter any additional information if prompted.    Question: Please clarify the diagnosis of encephalopathy as    When answering this query, please exercise your independent professional judgment. The fact that a question is being asked, does not imply that any particular answer is desired or expected.    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 Confusion, disorientation.\"    Clinical Indicators and Documentation:  -Vital Signs on admission, : 97.3-101-/80-98%RA  -Baseline Mental Status: Unclear. AOx2 in ED,   -Underlying cause of change in mental status:  - ED, : \"Her altered mental status is most likely due to a chronic cognitive decline\";  - Psych, : Alzheimer's dementia with behavioral disturbances; Psychosis NOS  -Imaging Results: CT head, : Cerebral atrophy and chronic periventricular white matter small vessel ischemic change.  -Lab Values: UA, : 75 LE; UC Growth indicates contamination with periurethral jami.; repeat UA : negative; Electrolytes, -: WNL  -Other: Per ED, : " "\"has been treated clinically for a urinary tract infection, she has been on Macrobid for 5 days\" Per HP, 1/20: \"Doubt the mild leukocyte esterase on her urinalysis represents a significant enough UTI to cause her symptoms, we will plan to finish Macrobid while new culture is pending\"    Treatment:  Rocephin IV (1/20-1/22), Psych consult    Risk Factors:  Alzheimer's dementia, recent UTI  Options provided:  -- Encephalopathy ruled out after further work up; worsening Alzheimer's dementia  -- Metabolic encephalopathy superimposed on Alzheimer's dementia present as evidenced by, Please specify additional information below  -- Toxic encephalopathy superimposed on Alzheimer's dementia present as evidenced by  -- Other - I will add my own diagnosis  -- Refer to Clinical Documentation Reviewer    Query created by: Zeinab Mayorga on 1/30/2025 11:56 AM      Electronically signed by:  TORIE VILLATORO MD 1/31/2025 12:31 AM          "